# Patient Record
Sex: FEMALE | Race: WHITE | NOT HISPANIC OR LATINO
[De-identification: names, ages, dates, MRNs, and addresses within clinical notes are randomized per-mention and may not be internally consistent; named-entity substitution may affect disease eponyms.]

---

## 2018-10-31 PROBLEM — Z00.00 ENCOUNTER FOR PREVENTIVE HEALTH EXAMINATION: Status: ACTIVE | Noted: 2018-10-31

## 2018-11-05 ENCOUNTER — RECORD ABSTRACTING (OUTPATIENT)
Age: 83
End: 2018-11-05

## 2018-11-05 DIAGNOSIS — Z87.39 PERSONAL HISTORY OF OTHER DISEASES OF THE MUSCULOSKELETAL SYSTEM AND CONNECTIVE TISSUE: ICD-10-CM

## 2018-11-05 DIAGNOSIS — Z87.442 PERSONAL HISTORY OF URINARY CALCULI: ICD-10-CM

## 2018-11-05 DIAGNOSIS — Z78.9 OTHER SPECIFIED HEALTH STATUS: ICD-10-CM

## 2018-11-05 DIAGNOSIS — Z82.49 FAMILY HISTORY OF ISCHEMIC HEART DISEASE AND OTHER DISEASES OF THE CIRCULATORY SYSTEM: ICD-10-CM

## 2018-11-05 DIAGNOSIS — S06.6X9A TRAUMATIC SUBARACHNOID HEMORRHAGE WITH LOSS OF CONSCIOUSNESS OF UNSPECIFIED DURATION, INITIAL ENCOUNTER: ICD-10-CM

## 2018-11-05 DIAGNOSIS — Z95.0 PRESENCE OF CARDIAC PACEMAKER: ICD-10-CM

## 2018-11-05 DIAGNOSIS — Z82.3 FAMILY HISTORY OF STROKE: ICD-10-CM

## 2018-11-05 DIAGNOSIS — Z85.828 PERSONAL HISTORY OF OTHER MALIGNANT NEOPLASM OF SKIN: ICD-10-CM

## 2018-11-05 DIAGNOSIS — Z90.49 ACQUIRED ABSENCE OF OTHER SPECIFIED PARTS OF DIGESTIVE TRACT: ICD-10-CM

## 2018-12-04 ENCOUNTER — RECORD ABSTRACTING (OUTPATIENT)
Age: 83
End: 2018-12-04

## 2018-12-07 ENCOUNTER — NON-APPOINTMENT (OUTPATIENT)
Age: 83
End: 2018-12-07

## 2018-12-07 ENCOUNTER — APPOINTMENT (OUTPATIENT)
Dept: CARDIOLOGY | Facility: CLINIC | Age: 83
End: 2018-12-07
Payer: MEDICARE

## 2018-12-07 VITALS
HEART RATE: 61 BPM | SYSTOLIC BLOOD PRESSURE: 135 MMHG | HEIGHT: 61 IN | BODY MASS INDEX: 23.98 KG/M2 | WEIGHT: 127 LBS | DIASTOLIC BLOOD PRESSURE: 70 MMHG

## 2018-12-07 PROCEDURE — 99215 OFFICE O/P EST HI 40 MIN: CPT

## 2018-12-07 PROCEDURE — 93000 ELECTROCARDIOGRAM COMPLETE: CPT

## 2018-12-07 NOTE — HISTORY OF PRESENT ILLNESS
[FreeTextEntry1] : Since her last examination 6 months ago she reports no major events or hospitalizations. She is quite active as a caregiver and doing her usual activities. She performs baylee chi. There have been no acute symptoms of shortness of breath, chest discomfort, lightheadedness, palpitations. She does note some imbalance. No syncope. The imbalance has been a long-standing issue.

## 2018-12-07 NOTE — PHYSICAL EXAM
[General Appearance - Well Developed] : well developed [Normal Appearance] : normal appearance [Well Groomed] : well groomed [General Appearance - Well Nourished] : well nourished [No Deformities] : no deformities [General Appearance - In No Acute Distress] : no acute distress [Normal Conjunctiva] : the conjunctiva exhibited no abnormalities [Eyelids - No Xanthelasma] : the eyelids demonstrated no xanthelasmas [Normal Oral Mucosa] : normal oral mucosa [No Oral Pallor] : no oral pallor [No Oral Cyanosis] : no oral cyanosis [Normal Jugular Venous A Waves Present] : normal jugular venous A waves present [Normal Jugular Venous V Waves Present] : normal jugular venous V waves present [No Jugular Venous Rosales A Waves] : no jugular venous rosales A waves [Respiration, Rhythm And Depth] : normal respiratory rhythm and effort [Exaggerated Use Of Accessory Muscles For Inspiration] : no accessory muscle use [Auscultation Breath Sounds / Voice Sounds] : lungs were clear to auscultation bilaterally [Heart Sounds] : normal S1 and S2 [Abdomen Soft] : soft [Abdomen Tenderness] : non-tender [Abdomen Mass (___ Cm)] : no abdominal mass palpated [Abnormal Walk] : normal gait [Gait - Sufficient For Exercise Testing] : the gait was sufficient for exercise testing [Nail Clubbing] : no clubbing of the fingernails [Cyanosis, Localized] : no localized cyanosis [Petechial Hemorrhages (___cm)] : no petechial hemorrhages [Skin Color & Pigmentation] : normal skin color and pigmentation [] : no rash [No Venous Stasis] : no venous stasis [Skin Lesions] : no skin lesions [No Skin Ulcers] : no skin ulcer [No Xanthoma] : no  xanthoma was observed [Oriented To Time, Place, And Person] : oriented to person, place, and time [Affect] : the affect was normal [Mood] : the mood was normal [No Anxiety] : not feeling anxious [FreeTextEntry1] : 1/6 systolic murmur apex

## 2018-12-07 NOTE — REVIEW OF SYSTEMS
[see HPI] : see HPI [Negative] : Endocrine [Easy Bleeding] : no tendency for easy bleeding [Easy Bruising] : no tendency for easy bruising

## 2018-12-07 NOTE — REASON FOR VISIT
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

## 2018-12-13 ENCOUNTER — RESULT REVIEW (OUTPATIENT)
Age: 83
End: 2018-12-13

## 2018-12-14 ENCOUNTER — RESULT REVIEW (OUTPATIENT)
Age: 83
End: 2018-12-14

## 2019-02-05 ENCOUNTER — APPOINTMENT (OUTPATIENT)
Dept: GERIATRICS | Facility: CLINIC | Age: 84
End: 2019-02-05

## 2019-02-07 ENCOUNTER — APPOINTMENT (OUTPATIENT)
Dept: NEUROLOGY | Facility: CLINIC | Age: 84
End: 2019-02-07
Payer: MEDICARE

## 2019-02-07 VITALS
WEIGHT: 130 LBS | HEIGHT: 61 IN | HEART RATE: 59 BPM | BODY MASS INDEX: 24.55 KG/M2 | SYSTOLIC BLOOD PRESSURE: 151 MMHG | TEMPERATURE: 97.6 F | DIASTOLIC BLOOD PRESSURE: 62 MMHG

## 2019-02-07 PROCEDURE — 99204 OFFICE O/P NEW MOD 45 MIN: CPT

## 2019-02-08 NOTE — ASSESSMENT
[FreeTextEntry1] : Ms. Ford is a 89-year-old woman with a chronic gait abnormality which is multifactorial – age, mild visual and hearing impairment, chronic white matter microvascular ischemic changes, ?alcohol use.  \par She has had multiple sessions of physical therapy for gait and balance training. The last time was one year ago. I recommend she participate once again in this therapy.\par

## 2019-02-08 NOTE — HISTORY OF PRESENT ILLNESS
[FreeTextEntry1] : Ms. Ford is a 89-year-old woman with a long-standing history of a feeling of gait difficulty which she describes as an unsteadiness and imbalance, not feeling secure on her feet and having a strong fear of falling.  She has a history of one fall with traumatic subarachnoid hemorrhage.  She has had this feeling of imbalance for several years before this fall.  She denies any numbness. This feeling of gait imbalance has progressively worsened over the years. She has had multiple sessions of physical therapy for gait training and fall prevention.  She does not use a cane.  \par \par She cares for her  who is chronically ill. 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 \par \par She drinks about one drink of alcohol per day.

## 2019-02-08 NOTE — REVIEW OF SYSTEMS
[Tingling] : tingling [Dizziness] : dizziness [Negative] : Heme/Lymph [de-identified] : of balance

## 2019-02-08 NOTE — PHYSICAL EXAM
[FreeTextEntry1] : Physical examination \par General: No acute distress, Awake, Alert.   \par Fundus: disc margins sharp.   \par Neck: no Carotid bruit.   \par Cardiovascular: Normal rate, Regular rhythm, No murmur.  \par \par \par Mental status \par Awake, alert, and oriented to person, time and place, Normal attention span and concentration, Recent and remote memory intact, Language intact, Fund of knowledge intact.   \par \par Cranial Nerves \par II: VFF  \par III, IV, VI: PERRL, EOMI.   \par V: Facial sensation is normal B/L.   \par VII: Facial strength is normal B/L. \par VIII: Gross hearing is intact.   \par IX, X: Palate is midline and elevates symmetrically.   \par XI: Trapezius normal strength.   \par XII: Tongue midline without atrophy or fasciculations. \par \par Motor exam  \par Muscle tone - no evidence of rigidity or resistance in all 4 extremities.  \par No atrophy or fasciculations \par Muscle Strength: arms and legs, proximal and distal flexors and extensors are normal \par \par No UE drift.\par \par Reflexes \par All present, normal, and symmetrical.   \par \par Plantars right: downgoing\par Plantars left: downgoing\par  \par \par Coordination \par Finger to nose: Normal.  \par Heel to shin: Normal.   \par \par Sensory \par Intact sensation to vibration and cold.\par \par \par Gait \par Unable to perform heel, toe, and tandem gait.  Veering to either side almost continually. \par

## 2019-03-07 ENCOUNTER — APPOINTMENT (OUTPATIENT)
Dept: GERIATRICS | Facility: CLINIC | Age: 84
End: 2019-03-07
Payer: MEDICARE

## 2019-03-07 VITALS
TEMPERATURE: 98.1 F | WEIGHT: 129 LBS | HEART RATE: 67 BPM | SYSTOLIC BLOOD PRESSURE: 132 MMHG | BODY MASS INDEX: 24.37 KG/M2 | OXYGEN SATURATION: 97 % | DIASTOLIC BLOOD PRESSURE: 60 MMHG

## 2019-03-07 PROCEDURE — 99214 OFFICE O/P EST MOD 30 MIN: CPT

## 2019-03-07 RX ORDER — NAFTIFINE HYDROCHLORIDE 20 MG/G
2 CREAM TOPICAL
Qty: 45 | Refills: 0 | Status: DISCONTINUED | COMMUNITY
Start: 2018-10-01 | End: 2019-03-07

## 2019-03-07 RX ORDER — BETAMETHASONE DIPROPIONATE 0.5 MG/ML
0.05 LOTION, AUGMENTED TOPICAL
Qty: 60 | Refills: 0 | Status: DISCONTINUED | COMMUNITY
Start: 2018-11-19 | End: 2019-03-07

## 2019-03-07 NOTE — ASSESSMENT
[FreeTextEntry1] : next visit annual physical\par coping with husbands dementia with one friend and especially with care giver support group\par sleeping well\par not wanting to be on urinary incontinence medication\par not wanting to increase antidepressant feels symptoms are controlled\par walking dog daily at least 3x a day and helping her cope as well\par feels memory is changing and she is still watching and playing jeopardy

## 2019-03-07 NOTE — SOCIAL HISTORY
[No falls in past year] : Patient reported no falls in the past year [Fully functional (bathing, dressing, toileting, transferring, walking, feeding)] : Fully functional (bathing, dressing, toileting, transferring, walking, feeding) [Fully functional (using the telephone, shopping, preparing meals, housekeeping, doing laundry, using transportation,] : Fully functional and needs no help or supervision to perform IADLs (using the telephone, shopping, preparing meals, housekeeping, doing laundry, using transportation, managing medications and managing finances) [Canes] : brigida [Smoke Detector] : smoke detector [Carbon Monoxide Detector] : carbon monoxide detector [Grab Bars] : grab bars [Shower Chair] : shower chair [Seat Belt] :  uses seat belt [Driving] : driving

## 2019-03-07 NOTE — HISTORY OF PRESENT ILLNESS
[0] : 2) Feeling down, depressed, or hopeless: Not at all [PHQ-2 Score ___] : PHQ-2 Score [unfilled] [FreeTextEntry1] :  with advancing dementia\par she benefits from [place] care givers meetings\par seeing dermatology for flaky scalp\par bladder incontinence - frequency and accidents both increasing\par no burning on urination

## 2019-03-07 NOTE — PHYSICAL EXAM
[General Appearance - Alert] : alert [General Appearance - In No Acute Distress] : in no acute distress [General Appearance - Well Nourished] : well nourished [General Appearance - Well Developed] : well developed [Sclera] : the sclera and conjunctiva were normal [PERRL With Normal Accommodation] : pupils were equal in size, round, and reactive to light [Extraocular Movements] : extraocular movements were intact [Normal Oral Mucosa] : normal oral mucosa [No Oral Pallor] : no oral pallor [Neck Appearance] : the appearance of the neck was normal [Respiration, Rhythm And Depth] : normal respiratory rhythm and effort [Exaggerated Use Of Accessory Muscles For Inspiration] : no accessory muscle use [Auscultation Breath Sounds / Voice Sounds] : lungs were clear to auscultation bilaterally [Heart Rate And Rhythm] : heart rate was normal and rhythm regular [Heart Sounds] : normal S1 and S2 [Heart Sounds Gallop] : no gallops [Bowel Sounds] : normal bowel sounds [Abdomen Soft] : soft [Abdomen Tenderness] : non-tender [No CVA Tenderness] : no ~M costovertebral angle tenderness [No Spinal Tenderness] : no spinal tenderness [Abnormal Walk] : normal gait [Skin Color & Pigmentation] : normal skin color and pigmentation [] : no rash [Affect] : the affect was normal [Mood] : the mood was normal [FreeTextEntry1] : uses cane

## 2019-03-07 NOTE — REVIEW OF SYSTEMS
[Incontinence] : incontinence [Fever] : no fever [Chills] : no chills [Eye Pain] : no eye pain [Red Eyes] : eyes not red [Sore Throat] : no sore throat [Hoarseness] : no hoarseness [Chest Pain] : no chest pain [Palpitations] : no palpitations [Shortness Of Breath] : no shortness of breath [Wheezing] : no wheezing [Abdominal Pain] : no abdominal pain [Vomiting] : no vomiting [Dysuria] : no dysuria [Arthralgias] : no arthralgias [Joint Pain] : no joint pain [Skin Lesions] : no skin lesions [Skin Wound] : no skin wound [Dizziness] : no dizziness [Fainting] : no fainting [Suicidal] : not suicidal [Sleep Disturbances] : no sleep disturbances

## 2019-04-25 ENCOUNTER — RESULT REVIEW (OUTPATIENT)
Age: 84
End: 2019-04-25

## 2019-04-25 ENCOUNTER — APPOINTMENT (OUTPATIENT)
Dept: GERIATRICS | Facility: CLINIC | Age: 84
End: 2019-04-25
Payer: MEDICARE

## 2019-04-25 VITALS
WEIGHT: 130 LBS | TEMPERATURE: 98.7 F | SYSTOLIC BLOOD PRESSURE: 128 MMHG | OXYGEN SATURATION: 96 % | HEIGHT: 61 IN | BODY MASS INDEX: 24.55 KG/M2 | HEART RATE: 62 BPM | DIASTOLIC BLOOD PRESSURE: 76 MMHG

## 2019-04-25 PROCEDURE — 99213 OFFICE O/P EST LOW 20 MIN: CPT

## 2019-04-25 NOTE — PHYSICAL EXAM
[Well-Appearing] : well-appearing [No Acute Distress] : no acute distress [Normal Sclera/Conjunctiva] : normal sclera/conjunctiva [PERRL] : pupils equal round and reactive to light [EOMI] : extraocular movements intact [Normal Oropharynx] : the oropharynx was normal [Normal Outer Ear/Nose] : the outer ears and nose were normal in appearance [Supple] : supple [Normal TMs] : both tympanic membranes were normal [No Lymphadenopathy] : no lymphadenopathy [No Respiratory Distress] : no respiratory distress  [No Accessory Muscle Use] : no accessory muscle use [Clear to Auscultation] : lungs were clear to auscultation bilaterally [Normal Rate] : normal rate  [Normal S1, S2] : normal S1 and S2 [Regular Rhythm] : with a regular rhythm [Non Tender] : non-tender [Soft] : abdomen soft [Non-distended] : non-distended [Normal Supraclavicular Nodes] : no supraclavicular lymphadenopathy [Normal Bowel Sounds] : normal bowel sounds [Normal Posterior Cervical Nodes] : no posterior cervical lymphadenopathy [Normal Anterior Cervical Nodes] : no anterior cervical lymphadenopathy [No Spinal Tenderness] : no spinal tenderness [No Focal Deficits] : no focal deficits [Grossly Normal Strength/Tone] : grossly normal strength/tone [Alert and Oriented x3] : oriented to person, place, and time [Normal Affect] : the affect was normal [de-identified] : 1/6 murmur [de-identified] : Tenderness to left ischium

## 2019-04-25 NOTE — REVIEW OF SYSTEMS
[Fever] : no fever [Chills] : no chills [Fatigue] : fatigue [Night Sweats] : no night sweats [Recent Change In Weight] : ~T no recent weight change [Chest Pain] : no chest pain [Palpitations] : no palpitations [Shortness Of Breath] : no shortness of breath [Dyspnea on Exertion] : no dyspnea on exertion [Dysuria] : no dysuria [Incontinence] : incontinence [Frequency] : no frequency [Back Pain] : back pain [de-identified] : Unsteady gait/swaying [Negative] : Neurological

## 2019-04-25 NOTE — HISTORY OF PRESENT ILLNESS
[FreeTextEntry8] : 89 year old female with a history of T2DM, HLD, HTN, PVD, valvular heart disease, SAH, depression, memory loss, vitamin D deficiency presents today for an acute visit.  \par \par Patient states the last 1-2 days she has been experiencing extreme fatigue.  Had gone to chair yoga 2 days ago.  Yesterday had left lower back / hip pain.  Today pain has improved but still present when sitting, took Aleve, which helped.  Fatigue has slightly improved.  She sleeps well, about 8 hours, uninterrupted. Denies fever, chills, melena, hematuria, BRBPR, hypothyroid symptoms, CP, palpitations, dizziness or syncope.  Patient did host [employer] for her family this weekend which was a lot for her.  [Family Member] : family member

## 2019-04-25 NOTE — HEALTH RISK ASSESSMENT
[No falls in past year] : Patient reported no falls in the past year [] : No [0] : 2) Feeling down, depressed, or hopeless: Not at all (0) [[id number]] : 0

## 2019-04-25 NOTE — ASSESSMENT
[FreeTextEntry1] : Fatigue: Will check CBC, CMP, A1c, TSH, UA, vit B12.  Has follow up with cardiology on 6/12/2019. \par \par LBP: Advised conservative treatment with OTC NSAIDS or Tylenol as needed, can also use topical heat.  Discussed proper body mechanics. \par \par Follow up with PCP as scheduled.

## 2019-05-21 ENCOUNTER — RX RENEWAL (OUTPATIENT)
Age: 84
End: 2019-05-21

## 2019-06-11 ENCOUNTER — RECORD ABSTRACTING (OUTPATIENT)
Age: 84
End: 2019-06-11

## 2019-06-11 DIAGNOSIS — Z82.49 FAMILY HISTORY OF ISCHEMIC HEART DISEASE AND OTHER DISEASES OF THE CIRCULATORY SYSTEM: ICD-10-CM

## 2019-06-12 ENCOUNTER — APPOINTMENT (OUTPATIENT)
Dept: CARDIOLOGY | Facility: CLINIC | Age: 84
End: 2019-06-12
Payer: MEDICARE

## 2019-06-12 ENCOUNTER — NON-APPOINTMENT (OUTPATIENT)
Age: 84
End: 2019-06-12

## 2019-06-12 VITALS
WEIGHT: 126 LBS | SYSTOLIC BLOOD PRESSURE: 134 MMHG | HEIGHT: 60 IN | DIASTOLIC BLOOD PRESSURE: 62 MMHG | BODY MASS INDEX: 24.74 KG/M2

## 2019-06-12 PROCEDURE — 99215 OFFICE O/P EST HI 40 MIN: CPT

## 2019-06-12 PROCEDURE — 93000 ELECTROCARDIOGRAM COMPLETE: CPT

## 2019-06-12 RX ORDER — ESOMEPRAZOLE MAGNESIUM 40 MG/1
40 CAPSULE, DELAYED RELEASE ORAL DAILY
Refills: 0 | Status: DISCONTINUED | COMMUNITY
End: 2019-06-12

## 2019-06-12 RX ORDER — ESCITALOPRAM OXALATE 10 MG/1
10 TABLET, FILM COATED ORAL
Refills: 0 | Status: DISCONTINUED | COMMUNITY
End: 2019-06-12

## 2019-06-12 NOTE — PHYSICAL EXAM
[General Appearance - Well Developed] : well developed [Normal Appearance] : normal appearance [General Appearance - Well Nourished] : well nourished [Well Groomed] : well groomed [General Appearance - In No Acute Distress] : no acute distress [No Deformities] : no deformities [Normal Conjunctiva] : the conjunctiva exhibited no abnormalities [Normal Oral Mucosa] : normal oral mucosa [Eyelids - No Xanthelasma] : the eyelids demonstrated no xanthelasmas [No Oral Cyanosis] : no oral cyanosis [Normal Jugular Venous A Waves Present] : normal jugular venous A waves present [No Oral Pallor] : no oral pallor [No Jugular Venous Rosales A Waves] : no jugular venous rosales A waves [Normal Jugular Venous V Waves Present] : normal jugular venous V waves present [Respiration, Rhythm And Depth] : normal respiratory rhythm and effort [Exaggerated Use Of Accessory Muscles For Inspiration] : no accessory muscle use [Auscultation Breath Sounds / Voice Sounds] : lungs were clear to auscultation bilaterally [Abdomen Soft] : soft [Heart Sounds] : normal S1 and S2 [Abdomen Mass (___ Cm)] : no abdominal mass palpated [Abdomen Tenderness] : non-tender [Abnormal Walk] : normal gait [Gait - Sufficient For Exercise Testing] : the gait was sufficient for exercise testing [Cyanosis, Localized] : no localized cyanosis [Petechial Hemorrhages (___cm)] : no petechial hemorrhages [Nail Clubbing] : no clubbing of the fingernails [] : no ischemic changes [Skin Color & Pigmentation] : normal skin color and pigmentation [No Skin Ulcers] : no skin ulcer [No Venous Stasis] : no venous stasis [Skin Lesions] : no skin lesions [No Xanthoma] : no  xanthoma was observed [Oriented To Time, Place, And Person] : oriented to person, place, and time [Affect] : the affect was normal [No Anxiety] : not feeling anxious [Mood] : the mood was normal [FreeTextEntry1] : 1/6 systolic murmur apex

## 2019-06-12 NOTE — REASON FOR VISIT
[FreeTextEntry1] : Ms. BILLY AREVALO presents for cardiovascular evaluation.\par \par Her problem list includes:\par Coronary artery disease status post bypass surgery  with previous stent.\par History of complete heart block treated with pacemaker therapy. 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 Pandey.\par \par

## 2019-06-12 NOTE — ADDENDUM
[FreeTextEntry1] : Instructions to staff:\par \par Send a copy of this report to the following provider(s):\par As above\par \par Schedule followup:\par 6 month office visit\par 3 months [facility] clinic\par \par Schedule testing:\par None required at present\par \par \par This report was generated using Dragon Dictation. Please excuse obvious typographical errors and contact this office for any questions. Any preliminary copy of this note given to the patient at the time of this visit has not been proofread or edited. This note is a part of a shared electronic record used by our physicians and may contain information generated by other physicians in this practice in addition to your visit with this office.

## 2019-06-12 NOTE — HISTORY OF PRESENT ILLNESS
[FreeTextEntry1] : This 89-year-old female patient presents for evaluation of complex cardiovascular and medical issues.\par \par Her problems is as noted above.\par Since her last examination 6 months ago she reports no major events or hospitalizations. She tries to stay active. She walks the dog regularly. No acute symptoms of shortness of breath, chest discomfort, palpitation, lightheadedness. She is unsteady and has seen her neurologist. She is also general medical followup and have laboratories as noted below.\par \par She is under considerable caregiver stress. Her  has a dementia.

## 2019-06-12 NOTE — DISCUSSION/SUMMARY
[FreeTextEntry1] : Problem list/impressions/recommendations.\par \par Coronary artery disease.\par Status post CABG 2015. LIMA to LAD, SVG to RCA, SVG to OM1. Previous stent.\par Clinically stable.\par \par Complete heart block/pacemaker therapy\par Medtronic dual-chamber MRI compatible.\par She has a transtelephonic system.\par I will ask her to return for a complete analysis.\par \par Valvular heart disease.\par Aortic stenosis, mitral insufficiency.\par Overall mild. Satisfactory echo findings 12/2018\par I have advised the patient regarding appropriate precautions.\par \par Hypertension\par Good control.\par \par Dyslipidemia\par Good LDL control.\par \par Peripheral vascular disease\par Status post right lower extremity stent or iliac artery dissection associated with inter-aortic balloon pump during angiography. Doctor Beau.\par Clinically stable.\par \par Recommendation\par Continue current routine\par For her convenience, I taken the liberty of changing her metoprolol tartrate to 25 mg twice a day instead of half of a 50 mg tablet twice a day.\par Followup 6 months to me\par [facility] clinic followup in 3 months\par \par

## 2019-08-14 ENCOUNTER — RESULT REVIEW (OUTPATIENT)
Age: 84
End: 2019-08-14

## 2019-08-14 ENCOUNTER — APPOINTMENT (OUTPATIENT)
Dept: GERIATRICS | Facility: CLINIC | Age: 84
End: 2019-08-14
Payer: MEDICARE

## 2019-08-14 VITALS
SYSTOLIC BLOOD PRESSURE: 150 MMHG | WEIGHT: 131 LBS | HEART RATE: 75 BPM | BODY MASS INDEX: 25.58 KG/M2 | DIASTOLIC BLOOD PRESSURE: 65 MMHG | TEMPERATURE: 98 F | OXYGEN SATURATION: 96 %

## 2019-08-14 PROCEDURE — 99215 OFFICE O/P EST HI 40 MIN: CPT | Mod: 25

## 2019-08-14 PROCEDURE — G0439: CPT

## 2019-08-16 NOTE — PHYSICAL EXAM
[General Appearance - Alert] : alert [General Appearance - In No Acute Distress] : in no acute distress [General Appearance - Well Nourished] : well nourished [General Appearance - Well Developed] : well developed [Sclera] : the sclera and conjunctiva were normal [PERRL With Normal Accommodation] : pupils were equal in size, round, and reactive to light [Extraocular Movements] : extraocular movements were intact [Normal Oral Mucosa] : normal oral mucosa [No Oral Pallor] : no oral pallor [Neck Appearance] : the appearance of the neck was normal [Respiration, Rhythm And Depth] : normal respiratory rhythm and effort [Exaggerated Use Of Accessory Muscles For Inspiration] : no accessory muscle use [Auscultation Breath Sounds / Voice Sounds] : lungs were clear to auscultation bilaterally [Heart Rate And Rhythm] : heart rate was normal and rhythm regular [Heart Sounds] : normal S1 and S2 [Heart Sounds Gallop] : no gallops [Breast Appearance] : normal in appearance [Breast Palpation Mass] : no palpable masses [Breast Abnormal Lactation (Galactorrhea)] : no nipple discharge [Bowel Sounds] : normal bowel sounds [Abdomen Soft] : soft [Abdomen Tenderness] : non-tender [Cervical Lymph Nodes Enlarged Posterior Bilaterally] : posterior cervical [Cervical Lymph Nodes Enlarged Anterior Bilaterally] : anterior cervical [Supraclavicular Lymph Nodes Enlarged Bilaterally] : supraclavicular [Axillary Lymph Nodes Enlarged Bilaterally] : axillary [No CVA Tenderness] : no ~M costovertebral angle tenderness [No Spinal Tenderness] : no spinal tenderness [Abnormal Walk] : normal gait [FreeTextEntry1] : uses cane [Skin Color & Pigmentation] : normal skin color and pigmentation [] : no rash [Affect] : the affect was normal [Mood] : the mood was normal

## 2019-08-16 NOTE — ASSESSMENT
[FreeTextEntry1] : next visit memory testing  - MOCA to evaluate\par based on results will discuss her driving and the need to continue at all\par frequency - rule out UTI refer to urogynecoloy Dr. Rogel\par discussed pseduodemetnia could memory deficits be worsened due to uncontrolled depression\par over husbands illness.  \par Prevnar given today

## 2019-08-16 NOTE — REVIEW OF SYSTEMS
[Fever] : no fever [Chills] : no chills [Eye Pain] : no eye pain [Red Eyes] : eyes not red [Sore Throat] : no sore throat [Hoarseness] : no hoarseness [Chest Pain] : no chest pain [Palpitations] : no palpitations [Shortness Of Breath] : no shortness of breath [Wheezing] : no wheezing [Abdominal Pain] : no abdominal pain [Vomiting] : no vomiting [Dysuria] : no dysuria [Incontinence] : incontinence [Arthralgias] : no arthralgias [Joint Pain] : no joint pain [Skin Lesions] : no skin lesions [Skin Wound] : no skin wound [Dizziness] : no dizziness [Fainting] : no fainting [Suicidal] : not suicidal [Sleep Disturbances] : no sleep disturbances

## 2019-08-16 NOTE — HISTORY OF PRESENT ILLNESS
[0] : 0 [Spouse] : spouse [None] : The patient has no concerns about alcohol abuse [Never] : has never used illicit drugs [Compliant with medications] : compliant with medications [Fully Independent] : fully independent [Drives without concerns] : drives without concerns [No history of falls] : no history of falls [Safe Driving Habits] : safe driving habits [Seatbelts] : seatbelts [Smoke Detectors] : smoke detectors [Carbon Monoxide Detector] : carbon monoxide detector [Bathroom Grab Bars] : bathroom grab bars [PMH Reviewed and Updated] : past medical history reviewed and updated [PSH Reviewed and Updated] : past surgical history reviewed and updated [Family History Reviewed and Updated] : family history reviewed and updated [Medication and Allergies Reconciled] : medication and allergies reconciled [Over the Past 2 Weeks, Have You Felt Down, Depressed, or Hopeless?] : 1.) Over the past 2 weeks, have you felt down, depressed, or hopeless? No [Over the Past 2 Weeks, Have You Felt Little Interest or Pleasure Doing Things?] : 2.) Over the past 2 weeks, have you felt little interest or pleasure doing things? No [Retired] : retired from work [Unable To Manage Meds] : ability to manage ~his/her~ medications [Adequate] : adequate [Children] : children [Extended Family] : extended family [FreeTextEntry1] : now on clobetasol topical for cradle cap\par complaining of urinary frequency no dysuria urinates at least 4-5x per day\par had not wanted to be on medications in past due to fear of constipation and dry mouth\par fatigue persists\par shoulder pain voltaran working with exercises Right shoulder\par anxiety and stress\par

## 2019-08-16 NOTE — REASON FOR VISIT
[Subsequent Annual Medicare Wellness Visit] : a subsequent annual Medicare wellness visit [Family Member] : family member

## 2019-09-12 ENCOUNTER — APPOINTMENT (OUTPATIENT)
Dept: CARDIOLOGY | Facility: CLINIC | Age: 84
End: 2019-09-12

## 2019-09-17 ENCOUNTER — RX RENEWAL (OUTPATIENT)
Age: 84
End: 2019-09-17

## 2019-10-10 ENCOUNTER — APPOINTMENT (OUTPATIENT)
Dept: GERIATRICS | Facility: CLINIC | Age: 84
End: 2019-10-10
Payer: MEDICARE

## 2019-10-10 PROCEDURE — 99213 OFFICE O/P EST LOW 20 MIN: CPT

## 2019-10-14 VITALS — RESPIRATION RATE: 16 BRPM | DIASTOLIC BLOOD PRESSURE: 70 MMHG | SYSTOLIC BLOOD PRESSURE: 140 MMHG | HEART RATE: 70 BPM

## 2019-10-14 NOTE — HISTORY OF PRESENT ILLNESS
[FreeTextEntry1] : presenting with daughter and \par has seen Dr. Rogel\par now on estrogen cream to help prevent UTI and help with urinary incontinence\par here also for flu shot

## 2019-10-14 NOTE — REVIEW OF SYSTEMS
[Eye Pain] : no eye pain [Chills] : no chills [Fever] : no fever [Hoarseness] : no hoarseness [Red Eyes] : eyes not red [Sore Throat] : no sore throat [Palpitations] : no palpitations [Shortness Of Breath] : no shortness of breath [Chest Pain] : no chest pain [Abdominal Pain] : no abdominal pain [Vomiting] : no vomiting [Wheezing] : no wheezing [Incontinence] : incontinence [Dysuria] : no dysuria [Skin Lesions] : no skin lesions [Arthralgias] : no arthralgias [Joint Pain] : no joint pain [Skin Wound] : no skin wound [Dizziness] : no dizziness [Fainting] : no fainting [Suicidal] : not suicidal [Sleep Disturbances] : no sleep disturbances

## 2019-10-14 NOTE — PHYSICAL EXAM
[General Appearance - In No Acute Distress] : in no acute distress [General Appearance - Alert] : alert [General Appearance - Well Developed] : well developed [General Appearance - Well Nourished] : well nourished [Sclera] : the sclera and conjunctiva were normal [PERRL With Normal Accommodation] : pupils were equal in size, round, and reactive to light [Extraocular Movements] : extraocular movements were intact [Normal Oral Mucosa] : normal oral mucosa [No Oral Pallor] : no oral pallor [Respiration, Rhythm And Depth] : normal respiratory rhythm and effort [Neck Appearance] : the appearance of the neck was normal [Exaggerated Use Of Accessory Muscles For Inspiration] : no accessory muscle use [Heart Rate And Rhythm] : heart rate was normal and rhythm regular [Auscultation Breath Sounds / Voice Sounds] : lungs were clear to auscultation bilaterally [Breast Appearance] : normal in appearance [Heart Sounds] : normal S1 and S2 [Heart Sounds Gallop] : no gallops [Breast Abnormal Lactation (Galactorrhea)] : no nipple discharge [Breast Palpation Mass] : no palpable masses [Bowel Sounds] : normal bowel sounds [Abdomen Tenderness] : non-tender [Abdomen Soft] : soft [Cervical Lymph Nodes Enlarged Posterior Bilaterally] : posterior cervical [Supraclavicular Lymph Nodes Enlarged Bilaterally] : supraclavicular [Cervical Lymph Nodes Enlarged Anterior Bilaterally] : anterior cervical [Axillary Lymph Nodes Enlarged Bilaterally] : axillary [No Spinal Tenderness] : no spinal tenderness [No CVA Tenderness] : no ~M costovertebral angle tenderness [] : no rash [Skin Color & Pigmentation] : normal skin color and pigmentation [FreeTextEntry1] : uses cane [Abnormal Walk] : normal gait [Affect] : the affect was normal [Mood] : the mood was normal

## 2019-10-23 ENCOUNTER — APPOINTMENT (OUTPATIENT)
Dept: GERIATRICS | Facility: CLINIC | Age: 84
End: 2019-10-23

## 2019-11-07 ENCOUNTER — APPOINTMENT (OUTPATIENT)
Dept: GERIATRICS | Facility: CLINIC | Age: 84
End: 2019-11-07
Payer: MEDICARE

## 2019-11-07 VITALS
DIASTOLIC BLOOD PRESSURE: 70 MMHG | BODY MASS INDEX: 25.19 KG/M2 | HEART RATE: 70 BPM | SYSTOLIC BLOOD PRESSURE: 150 MMHG | OXYGEN SATURATION: 96 % | TEMPERATURE: 98.4 F | WEIGHT: 129 LBS

## 2019-11-07 DIAGNOSIS — E55.9 VITAMIN D DEFICIENCY, UNSPECIFIED: ICD-10-CM

## 2019-11-07 DIAGNOSIS — E53.8 DEFICIENCY OF OTHER SPECIFIED B GROUP VITAMINS: ICD-10-CM

## 2019-11-07 DIAGNOSIS — S06.6X0D TRAUMATIC SUBARACHNOID HEMORRHAGE W/OUT LOSS OF CONSCIOUSNESS, SUBSEQUENT ENCOUNTER: ICD-10-CM

## 2019-11-07 PROCEDURE — 99215 OFFICE O/P EST HI 40 MIN: CPT

## 2019-11-07 NOTE — REVIEW OF SYSTEMS
[Incontinence] : incontinence [Fever] : no fever [Chills] : no chills [Eye Pain] : no eye pain [Red Eyes] : eyes not red [Sore Throat] : no sore throat [Hoarseness] : no hoarseness [Chest Pain] : no chest pain [Palpitations] : no palpitations [Shortness Of Breath] : no shortness of breath [Wheezing] : no wheezing [Abdominal Pain] : no abdominal pain [Vomiting] : no vomiting [Dysuria] : no dysuria [Arthralgias] : no arthralgias [Joint Pain] : no joint pain [Skin Lesions] : no skin lesions [Dizziness] : no dizziness [Skin Wound] : no skin wound [Suicidal] : not suicidal [Fainting] : no fainting [Sleep Disturbances] : no sleep disturbances

## 2019-11-07 NOTE — PHYSICAL EXAM
[General Appearance - Alert] : alert [General Appearance - In No Acute Distress] : in no acute distress [General Appearance - Well Developed] : well developed [General Appearance - Well Nourished] : well nourished [PERRL With Normal Accommodation] : pupils were equal in size, round, and reactive to light [Sclera] : the sclera and conjunctiva were normal [Normal Oral Mucosa] : normal oral mucosa [Extraocular Movements] : extraocular movements were intact [No Oral Pallor] : no oral pallor [Neck Appearance] : the appearance of the neck was normal [Exaggerated Use Of Accessory Muscles For Inspiration] : no accessory muscle use [Respiration, Rhythm And Depth] : normal respiratory rhythm and effort [Auscultation Breath Sounds / Voice Sounds] : lungs were clear to auscultation bilaterally [Heart Rate And Rhythm] : heart rate was normal and rhythm regular [Heart Sounds] : normal S1 and S2 [Heart Sounds Gallop] : no gallops [Breast Appearance] : normal in appearance [Breast Palpation Mass] : no palpable masses [Abdomen Soft] : soft [Bowel Sounds] : normal bowel sounds [Breast Abnormal Lactation (Galactorrhea)] : no nipple discharge [Abdomen Tenderness] : non-tender [Cervical Lymph Nodes Enlarged Anterior Bilaterally] : anterior cervical [Supraclavicular Lymph Nodes Enlarged Bilaterally] : supraclavicular [Cervical Lymph Nodes Enlarged Posterior Bilaterally] : posterior cervical [Axillary Lymph Nodes Enlarged Bilaterally] : axillary [No CVA Tenderness] : no ~M costovertebral angle tenderness [Abnormal Walk] : normal gait [No Spinal Tenderness] : no spinal tenderness [Skin Color & Pigmentation] : normal skin color and pigmentation [] : no rash [Affect] : the affect was normal [Mood] : the mood was normal [FreeTextEntry1] : uses cane

## 2019-11-07 NOTE — ASSESSMENT
[FreeTextEntry1] : MOCA done 28/30\par normal results\par memory loss could be age related (high educational status) \par continue estrogen cream\par discussed pseduodemetnia could memory deficits be worsened due to uncontrolled depression\par over husbands illness.  \par

## 2019-11-07 NOTE — SOCIAL HISTORY
[One fall no injury in past year] : Patient reported one fall in the past year without injury [Fully functional (bathing, dressing, toileting, transferring, walking, feeding)] : Fully functional (bathing, dressing, toileting, transferring, walking, feeding) [Fully functional (using the telephone, shopping, preparing meals, housekeeping, doing laundry, using transportation,] : Fully functional and needs no help or supervision to perform IADLs (using the telephone, shopping, preparing meals, housekeeping, doing laundry, using transportation, managing medications and managing finances) [Canes] : brigida [Grab Bars] : grab bars [Carbon Monoxide Detector] : carbon monoxide detector [Smoke Detector] : smoke detector [Driving] : driving [Shower Chair] : shower chair [Seat Belt] :  uses seat belt

## 2019-11-07 NOTE — HISTORY OF PRESENT ILLNESS
[PHQ-2 Score ___] : PHQ-2 Score [unfilled] [1] : 2) Feeling down, depressed, or hopeless for several days [FreeTextEntry1] : presenting with daughter Beth\par has seen Dr. Rogel\par now on estrogen cream to help prevent UTI and help with urinary incontinence\par here for memory testing\par

## 2019-12-06 ENCOUNTER — MEDICATION RENEWAL (OUTPATIENT)
Age: 84
End: 2019-12-06

## 2020-02-14 ENCOUNTER — RX RENEWAL (OUTPATIENT)
Age: 85
End: 2020-02-14

## 2020-02-25 ENCOUNTER — RESULT REVIEW (OUTPATIENT)
Age: 85
End: 2020-02-25

## 2020-02-25 ENCOUNTER — APPOINTMENT (OUTPATIENT)
Dept: CARDIOLOGY | Facility: CLINIC | Age: 85
End: 2020-02-25
Payer: MEDICARE

## 2020-02-25 VITALS
SYSTOLIC BLOOD PRESSURE: 144 MMHG | DIASTOLIC BLOOD PRESSURE: 70 MMHG | BODY MASS INDEX: 24.54 KG/M2 | OXYGEN SATURATION: 97 % | HEIGHT: 60 IN | HEART RATE: 68 BPM | WEIGHT: 125 LBS

## 2020-02-25 PROCEDURE — 99213 OFFICE O/P EST LOW 20 MIN: CPT

## 2020-02-25 RX ORDER — NITROGLYCERIN 0.4 MG/1
0.4 TABLET SUBLINGUAL
Refills: 0 | Status: COMPLETED | COMMUNITY
End: 2020-02-25

## 2020-02-27 NOTE — REVIEW OF SYSTEMS
[Dizziness] : dizziness [Under Stress] : under stress [Fever] : no fever [Headache] : no headache [Chills] : no chills [Shortness Of Breath] : no shortness of breath [Dyspnea on exertion] : not dyspnea during exertion [Chest Pain] : no chest pain [Palpitations] : no palpitations [Cough] : no cough [Easy Bruising] : no tendency for easy bruising [Confusion] : no confusion was observed

## 2020-02-27 NOTE — PHYSICAL EXAM
[General Appearance - Well Developed] : well developed [Normal Appearance] : normal appearance [Well Groomed] : well groomed [General Appearance - Well Nourished] : well nourished [Heart Rate And Rhythm] : heart rate and rhythm were normal [General Appearance - In No Acute Distress] : no acute distress [Systolic grade ___/6] : A grade [unfilled]/6 systolic murmur was heard. [Abdomen Soft] : soft [Cyanosis, Localized] : no localized cyanosis [] : no rash [Oriented To Time, Place, And Person] : oriented to person, place, and time [Impaired Insight] : insight and judgment were intact [Affect] : the affect was normal [FreeTextEntry1] : uses cane

## 2020-02-27 NOTE — HISTORY OF PRESENT ILLNESS
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

## 2020-05-06 ENCOUNTER — APPOINTMENT (OUTPATIENT)
Dept: GERIATRICS | Facility: CLINIC | Age: 85
End: 2020-05-06
Payer: MEDICARE

## 2020-05-06 ENCOUNTER — APPOINTMENT (OUTPATIENT)
Dept: GERIATRICS | Facility: CLINIC | Age: 85
End: 2020-05-06

## 2020-05-06 PROCEDURE — 99215 OFFICE O/P EST HI 40 MIN: CPT | Mod: 95

## 2020-05-11 NOTE — HISTORY OF PRESENT ILLNESS
[Stable] : Status: Stable [Moderate] : Stage: Moderate [Memory Lapses Or Loss] : stable memory impairment [1] : 1) Little interest or pleasure doing things for several days [0] : 2) Feeling down, depressed, or hopeless: Not at all [PHQ-2 Score ___] : PHQ-2 Score [unfilled] [FreeTextEntry1] : presenting with daughter [place]\par several concerns:\par imbalance/dizziness - daughters have seen her favor one side, veer to one side - but not consistently\par incontinence - has seen Dr. Rogel but hesitant to start medications due to side effects\par Does feel fatigue\par Needs her toenails cut - needs help due to balance issues\par Has dry skin\par Feels memory is "fine" but daughters disagree and want her to stop driving\par in pandemic clearly she has not been able to drive\par

## 2020-05-11 NOTE — REVIEW OF SYSTEMS
[Incontinence] : incontinence [Fever] : no fever [Chills] : no chills [Eye Pain] : no eye pain [Red Eyes] : eyes not red [Sore Throat] : no sore throat [Hoarseness] : no hoarseness [Chest Pain] : no chest pain [Palpitations] : no palpitations [Shortness Of Breath] : no shortness of breath [Abdominal Pain] : no abdominal pain [Wheezing] : no wheezing [Vomiting] : no vomiting [Dysuria] : no dysuria [Arthralgias] : no arthralgias [Joint Pain] : no joint pain [Skin Wound] : no skin wound [Skin Lesions] : no skin lesions [Dizziness] : no dizziness [Fainting] : no fainting [Suicidal] : not suicidal [Sleep Disturbances] : no sleep disturbances

## 2020-05-11 NOTE — ASSESSMENT
[FreeTextEntry1] : last MOCA done 28/30\par memory loss could be age related (high educational status) \par normal results\par Now given instability and worsening memory will consider MRI Brain and repeat MOCA on next visit\par also to consider OT driving eval for her safety\par refer to podiatry\par discussed pseudodementia could memory deficits be worsened due to uncontrolled depression\par over husbands illness.  \par also to check A1C given last level elevated, now brook skin, dizziness\par she will cut back on sweets\par labs when pandemic restircitons ease

## 2020-05-11 NOTE — PHYSICAL EXAM
[General Appearance - Alert] : alert [General Appearance - In No Acute Distress] : in no acute distress [Sclera] : the sclera and conjunctiva were normal [Neck Appearance] : the appearance of the neck was normal [Exaggerated Use Of Accessory Muscles For Inspiration] : no accessory muscle use [Skin Color & Pigmentation] : normal skin color and pigmentation [Affect] : the affect was normal [] : no rash [Mood] : the mood was normal [General Appearance - Well-Appearing] : healthy appearing [Strabismus] : no strabismus was seen [Outer Ear] : the ears and nose were normal in appearance [No Oral Cyanosis] : no oral cyanosis [Jugular Venous Distention Increased] : there was no jugular-venous distention [FreeTextEntry1] : forgetful, relies on daughter

## 2020-05-11 NOTE — REASON FOR VISIT
[Home] : at home, [unfilled] , at the time of the visit. [Medical Office: ([facility])___] : at the medical office located in  [Self] : self [Patient] : the patient [Pre-Visit Preparation] : pre-visit preparation was done [Acute] : an acute visit [Family Member] : family member

## 2020-06-02 RX ORDER — CYANOCOBALAMIN (VITAMIN B-12) 2500 MCG
2500 TABLET, SUBLINGUAL SUBLINGUAL DAILY
Qty: 90 | Refills: 3 | Status: DISCONTINUED | COMMUNITY
Start: 2020-03-05 | End: 2020-06-02

## 2020-06-02 RX ORDER — PNV NO.95/FERROUS FUM/FOLIC AC 28MG-0.8MG
TABLET ORAL
Refills: 0 | Status: DISCONTINUED | COMMUNITY
End: 2020-06-02

## 2020-06-03 ENCOUNTER — NON-APPOINTMENT (OUTPATIENT)
Age: 85
End: 2020-06-03

## 2020-06-03 ENCOUNTER — APPOINTMENT (OUTPATIENT)
Dept: CARDIOLOGY | Facility: CLINIC | Age: 85
End: 2020-06-03
Payer: MEDICARE

## 2020-06-03 VITALS
SYSTOLIC BLOOD PRESSURE: 130 MMHG | BODY MASS INDEX: 25.32 KG/M2 | HEIGHT: 60 IN | WEIGHT: 129 LBS | HEART RATE: 61 BPM | DIASTOLIC BLOOD PRESSURE: 70 MMHG

## 2020-06-03 PROCEDURE — 99215 OFFICE O/P EST HI 40 MIN: CPT

## 2020-06-03 PROCEDURE — 93000 ELECTROCARDIOGRAM COMPLETE: CPT

## 2020-06-03 NOTE — ASSESSMENT
[FreeTextEntry1] : EKG 6/3/2020.  Sinus rhythm with a–V pacing.  Magnet rate equals 85, nominal\par EKG 6/12/19. Dual-chamber pacemaker. VPC. Magnet rate nominal 85

## 2020-06-03 NOTE — DISCUSSION/SUMMARY
[FreeTextEntry1] : Brief recommendations and follow-up: (see above for details)\par \par The patient is doing extremely well.\par She notes some lightheadedness but thankfully no fainting.\par I will recheck her valve status with an echocardiogram.\par The remainder of her medical regimen remains unchanged.\par I congratulated her and encouraged her regarding her active lifestyle.\par Her main concern is regarding her balance.  She did have previous consultation with Dr. Sarmiento\par Next routine cardiology visit 6 months\par

## 2020-06-03 NOTE — REASON FOR VISIT
[FreeTextEntry1] : Ms. BILLY AREVALO has the following complex problem list:\par \par Coronary artery disease status post bypass surgery 2015 with previous stent.\par History of complete heart block treated with pacemaker therapy. Medtronic device.\par Valvular heart disease.\par History of subarachnoid hemorrhage.\par Dyslipidemia.\par Cardiac arrhythmia/ventricular tachycardia.\par Carotid atherosclerosis.\par Peripheral vascular disease with right lower extremity stent, a complication of her iliac artery dissection during angiography.\par Type 2 diabetes\par \par She has additional medical problems as noted.\par \par Her primary care physician is Dr. Pandey.\par \par

## 2020-06-03 NOTE — HISTORY OF PRESENT ILLNESS
[FreeTextEntry1] : This 89 year-old female patient presents for cardiovascular evaluation.\par \par Her problem list is as noted above.\par \par Insert last examination approximately 1 year ago she reports no major events or hospitalizations.  She remains with some chronic imbalance but thankfully no fainting or falls.  No acute symptoms of shortness of breath, chest discomfort, palpitation.\par \par She had a evaluation with our nurse practitioner a number of months ago for lightheadedness.\par She had a transtelephonic pacemaker evaluation in March.\par She had a telephonic evaluation with her primary care physician.\par \par She tries to stay active walking the dog 3 times a day and she cares for her  Krishna.

## 2020-06-25 ENCOUNTER — APPOINTMENT (OUTPATIENT)
Dept: CARDIOLOGY | Facility: CLINIC | Age: 85
End: 2020-06-25
Payer: MEDICARE

## 2020-07-20 ENCOUNTER — RESULT REVIEW (OUTPATIENT)
Age: 85
End: 2020-07-20

## 2020-07-22 ENCOUNTER — APPOINTMENT (OUTPATIENT)
Dept: GERIATRICS | Facility: CLINIC | Age: 85
End: 2020-07-22
Payer: MEDICARE

## 2020-07-22 PROCEDURE — 99214 OFFICE O/P EST MOD 30 MIN: CPT | Mod: 95

## 2020-07-22 NOTE — REASON FOR VISIT
[Home] : at home, [unfilled] , at the time of the visit. [Medical Office: ([facility])___] : at the medical office located in  [Verbal consent obtained from patient] : the patient, [unfilled] [Follow-Up] : a follow-up visit [Family Member] : family member

## 2020-07-22 NOTE — ASSESSMENT
[FreeTextEntry1] : MRI shows microvascular changes\par discussed she should not be driving\par goal is BP lipid and A1C control\par due for repeat labs\par \par

## 2020-07-22 NOTE — HISTORY OF PRESENT ILLNESS
[FreeTextEntry1] : presenting with daughter Gauri \par several concerns:\par to review MRI Brain\par eating more desserts\par daughters concerned about her driving\par feels memory is "fine" but daughters disagree and want her to stop driving\par in pandemic clearly she has not been able to drive\par  [0] : 2) Feeling down, depressed, or hopeless: Not at all [PHQ-2 Score ___] : PHQ-2 Score [unfilled]

## 2020-07-23 ENCOUNTER — APPOINTMENT (OUTPATIENT)
Dept: CARDIOLOGY | Facility: CLINIC | Age: 85
End: 2020-07-23

## 2020-07-23 ENCOUNTER — APPOINTMENT (OUTPATIENT)
Dept: CARDIOLOGY | Facility: CLINIC | Age: 85
End: 2020-07-23
Payer: MEDICARE

## 2020-07-23 PROCEDURE — 93280 PM DEVICE PROGR EVAL DUAL: CPT

## 2020-07-23 NOTE — PROCEDURE
[Pacemaker] : pacemaker [Complete Heart Block] : complete heart block [DDD] : DDD [None] : none [de-identified] : Medtronic [de-identified] : [employer]visa  MRI A2DR01 [de-identified] : 8/14/2015 [de-identified] : [id number] [de-identified] : 4 years [de-identified] : 60 [de-identified] : normal device function, no events

## 2020-08-11 ENCOUNTER — RX RENEWAL (OUTPATIENT)
Age: 85
End: 2020-08-11

## 2020-10-02 ENCOUNTER — MED ADMIN CHARGE (OUTPATIENT)
Age: 85
End: 2020-10-02

## 2020-10-02 ENCOUNTER — APPOINTMENT (OUTPATIENT)
Dept: GERIATRICS | Facility: CLINIC | Age: 85
End: 2020-10-02
Payer: MEDICARE

## 2020-10-02 PROCEDURE — ZZZZZ: CPT

## 2020-10-09 ENCOUNTER — APPOINTMENT (OUTPATIENT)
Dept: GERIATRICS | Facility: CLINIC | Age: 85
End: 2020-10-09
Payer: MEDICARE

## 2020-10-09 PROCEDURE — 99496 TRANSJ CARE MGMT HIGH F2F 7D: CPT | Mod: 95

## 2020-10-12 NOTE — REVIEW OF SYSTEMS
[Fever] : no fever [Chills] : no chills [Fatigue] : fatigue [Night Sweats] : no night sweats [Pain] : no pain [Itching] : no itching [Nasal Discharge] : no nasal discharge [Sore Throat] : no sore throat [Chest Pain] : no chest pain [Palpitations] : no palpitations [Shortness Of Breath] : no shortness of breath [Wheezing] : no wheezing [Cough] : no cough [Abdominal Pain] : no abdominal pain [Vomiting] : no vomiting [Dysuria] : no dysuria [Joint Pain] : joint pain [Muscle Pain] : muscle pain [Skin Rash] : no skin rash [Memory Loss] : memory loss

## 2020-10-12 NOTE — PHYSICAL EXAM
[No Acute Distress] : no acute distress [Well Nourished] : well nourished [No Respiratory Distress] : no respiratory distress  [No Accessory Muscle Use] : no accessory muscle use [73055 - High Complexity requires an extensive number of possible diagnoses and/or the management options, extensive complexity of the medical data (tests, etc.) to be reviewed, and a high risk of significant complications, morbidity, and/or mortality as w] : High Complexity

## 2020-10-12 NOTE — ASSESSMENT
[FreeTextEntry1] : acute R sided hip pain\par new since hospital dc\par will order for home xray\par continue with home PT\par advised that if she was unhappy with [facility] orthopedics can call her [facility] orthopedics for transfer of care\par tylenol for pain in shoulder and hip - heat BID\par continue BP logs\par erratic readings concern but BP elevated in hospital due to stress anxiety\par and now can bottom out\par prognosis guarded\par hospital bed ordered \par

## 2020-10-12 NOTE — HISTORY OF PRESENT ILLNESS
[Home] : at home, [unfilled] , at the time of the visit. [Medical Office: ([facility])___] : at the medical office located in  [Family Member] : family member [Verbal consent obtained from patient] : the patient, [unfilled] [Post-hospitalization from ___ Hospital] : Post-hospitalization from [unfilled] Hospital [Admitted on: ___] : The patient was admitted on [unfilled] [Discharged on ___] : discharged on [unfilled] [Discharge Med List] : discharge medication list [Patient Contacted By: ____] : and contacted by [unfilled] [FreeTextEntry2] : pt had reported fall which promted inpt admit- this happened to be on the day she had flu shot \par \par DX fall Humerous fx right side - not always wearing sling due for PT today - evaluation\par per daughter CT head all normal\par pt is very weak now having back pain as well\par hospital bed has been ordered \par \par BP erratic and remains this way\par also has new pain and some weakness\par needing help with ADLs due to humeral fracture\par open to changing orthopedics to [facility]\par was not happy with experience at [facility]\par BP was very high\par now on metoprolol 25mg in AM and 50mg in PM\par SBP on log 128-147/60-74\par

## 2020-10-21 ENCOUNTER — APPOINTMENT (OUTPATIENT)
Dept: CARDIOLOGY | Facility: CLINIC | Age: 85
End: 2020-10-21
Payer: MEDICARE

## 2020-10-21 VITALS
SYSTOLIC BLOOD PRESSURE: 130 MMHG | HEART RATE: 6 BPM | WEIGHT: 130 LBS | OXYGEN SATURATION: 96 % | DIASTOLIC BLOOD PRESSURE: 50 MMHG | BODY MASS INDEX: 25.39 KG/M2

## 2020-10-21 VITALS — SYSTOLIC BLOOD PRESSURE: 144 MMHG | DIASTOLIC BLOOD PRESSURE: 60 MMHG

## 2020-10-21 PROCEDURE — 99213 OFFICE O/P EST LOW 20 MIN: CPT

## 2020-10-21 NOTE — HISTORY OF PRESENT ILLNESS
[FreeTextEntry1] : 89 years old female with history of\par Coronory artery disease- CABG 2015 with previous stent\par Complete heart block treated with PPM  (Medtronic)\par Valvular Heart Disease\par History of subarachnoid hemorrhage\par Dyslipidemia\par Cardiac arrhythmia/ventricular tachycardia\par Carotid atherosclerosis\par PVD with RLE stent, complication of her ileac artery dissection during angiography\par Type 2 diabetes

## 2020-10-21 NOTE — PHYSICAL EXAM
[Normal Appearance] : normal appearance [General Appearance - In No Acute Distress] : no acute distress [FreeTextEntry1] : in WC today with family member, can get up with minimal assistance [Respiration, Rhythm And Depth] : normal respiratory rhythm and effort [Auscultation Breath Sounds / Voice Sounds] : lungs were clear to auscultation bilaterally [Heart Rate And Rhythm] : heart rate and rhythm were normal [Edema] : no peripheral edema present [Skin Color & Pigmentation] : normal skin color and pigmentation

## 2020-10-21 NOTE — REASON FOR VISIT
[Follow-Up - Clinic] : a clinic follow-up of [Hypertension] : hypertension [FreeTextEntry1] : Eleni comes today for BP f/u.  S/P fall/syncopal episode 2 weeks ago.  Got up to answer doorbell and lost consciousness and suffered fracture right humerus.  She was hospitalized.  PPM was checked and found to be functioning appropriately.  BP was elevated at times in hospital and metoprolol was adjusted.\par Since home BP readings 129-164 systolic.  She did receive one reading of 205/94.\par Overall she feels well\par Denies CP/SOB/palps

## 2020-11-05 ENCOUNTER — RX RENEWAL (OUTPATIENT)
Age: 85
End: 2020-11-05

## 2020-12-03 ENCOUNTER — NON-APPOINTMENT (OUTPATIENT)
Age: 85
End: 2020-12-03

## 2020-12-07 ENCOUNTER — NON-APPOINTMENT (OUTPATIENT)
Age: 85
End: 2020-12-07

## 2020-12-07 ENCOUNTER — APPOINTMENT (OUTPATIENT)
Dept: CARDIOLOGY | Facility: CLINIC | Age: 85
End: 2020-12-07
Payer: MEDICARE

## 2020-12-07 VITALS — SYSTOLIC BLOOD PRESSURE: 170 MMHG | DIASTOLIC BLOOD PRESSURE: 75 MMHG

## 2020-12-07 VITALS
BODY MASS INDEX: 24.74 KG/M2 | WEIGHT: 126 LBS | DIASTOLIC BLOOD PRESSURE: 70 MMHG | SYSTOLIC BLOOD PRESSURE: 176 MMHG | HEIGHT: 60 IN

## 2020-12-07 PROCEDURE — 99215 OFFICE O/P EST HI 40 MIN: CPT

## 2020-12-07 PROCEDURE — 93000 ELECTROCARDIOGRAM COMPLETE: CPT

## 2020-12-07 RX ORDER — METOPROLOL TARTRATE 25 MG/1
25 TABLET, FILM COATED ORAL
Qty: 270 | Refills: 3 | Status: DISCONTINUED | COMMUNITY
Start: 2019-05-21 | End: 2020-12-07

## 2020-12-07 NOTE — DISCUSSION/SUMMARY
[FreeTextEntry1] : Brief recommendations and follow-up: (see above for details)\par \par The patient remains with a considerable problem list but she is well compensated.\par Her blood pressure could be improved.\par I have changed her short acting metoprolol to Toprol-XL 50 mg twice a day.\par The previously scheduled echocardiogram will be rescheduled.\par She has an upcoming appointment for a device check.  Consider increasing the lower rate limit to 70 at that time.\par Next full cardiology visit with me 6 months.

## 2020-12-07 NOTE — HISTORY OF PRESENT ILLNESS
[FreeTextEntry1] : This 89 year-old female patient presents for cardiovascular evaluation.\par \par Her problem list is as noted above.\par \par Since her last full examination 6 months ago she has had some medical evaluations.  She did suffer a trip and fall with fracture of her right humerus.  She was treated with a sling.  No operation required.  She has made a recovery.\par \par She remains quite active.  She does note some imbalance and uses a cane.  No acute symptoms of shortness of breath, chest discomfort, palpitation, fainting.  She did have a nurse practitioner visit in October for hypertension evaluation.  Blood pressure was in the 140-150/60 range at that time.\par

## 2020-12-07 NOTE — PHYSICAL EXAM
[FreeTextEntry1] : Uses a cane necessary..  Status post right humeral fracture, conservative therapy.

## 2020-12-07 NOTE — ASSESSMENT
[FreeTextEntry1] : EKG 12/7/2020.  Dual-chamber pacing.  Magnet rate equals 85, normal.\par EKG 6/3/2020.  Sinus rhythm with a–V pacing.  Magnet rate equals 85, nominal\par EKG 6/12/19. Dual-chamber pacemaker. VPC. Magnet rate nominal 85

## 2021-01-05 ENCOUNTER — RX RENEWAL (OUTPATIENT)
Age: 86
End: 2021-01-05

## 2021-01-14 ENCOUNTER — RESULT REVIEW (OUTPATIENT)
Age: 86
End: 2021-01-14

## 2021-01-28 ENCOUNTER — APPOINTMENT (OUTPATIENT)
Dept: CARDIOLOGY | Facility: CLINIC | Age: 86
End: 2021-01-28
Payer: MEDICARE

## 2021-01-28 VITALS
TEMPERATURE: 98.4 F | WEIGHT: 127.25 LBS | SYSTOLIC BLOOD PRESSURE: 160 MMHG | HEIGHT: 60 IN | OXYGEN SATURATION: 96 % | HEART RATE: 60 BPM | DIASTOLIC BLOOD PRESSURE: 64 MMHG | BODY MASS INDEX: 24.98 KG/M2

## 2021-01-28 PROCEDURE — 93280 PM DEVICE PROGR EVAL DUAL: CPT

## 2021-01-28 PROCEDURE — 99213 OFFICE O/P EST LOW 20 MIN: CPT

## 2021-01-28 PROCEDURE — 99072 ADDL SUPL MATRL&STAF TM PHE: CPT

## 2021-01-28 RX ORDER — BETAMETHASONE DIPROPIONATE 0.5 MG/G
0.05 LOTION TOPICAL
Qty: 60 | Refills: 0 | Status: COMPLETED | COMMUNITY
Start: 2019-11-20 | End: 2021-01-28

## 2021-01-28 NOTE — DISCUSSION/SUMMARY
[Pacemaker Function Normal] : normal pacemaker function [Patient] : the patient [FreeTextEntry1] : Return in one month for BP check.\par Return in 3 months for device analysis. \par Follow up with Dr. Lakhani on 6/9/21 as scheduled.

## 2021-01-28 NOTE — PROCEDURE
[Complete Heart Block] : complete heart block [DDD] : DDD [Counters Reset] : the counters were reset [de-identified] : Medtronic [de-identified] : Advisa  MRI A2DR01 [de-identified] : [id number] [de-identified] : 8/14/2015 [de-identified] : 60/130 [de-identified] : 3.5 years [de-identified] : lower rate limit increased to 70bpm and rate response turned on [de-identified] : Normal device function. There were 3 ventricular events, NSVT, with longest lasting 12 beats.

## 2021-01-28 NOTE — PHYSICAL EXAM
[Normal Appearance] : normal appearance [Well Groomed] : well groomed [General Appearance - In No Acute Distress] : no acute distress [Heart Rate And Rhythm] : heart rate and rhythm were normal [Heart Sounds] : normal S1 and S2 [Arterial Pulses Normal] : the arterial pulses were normal [Edema] : no peripheral edema present [] : no respiratory distress [Respiration, Rhythm And Depth] : normal respiratory rhythm and effort [Auscultation Breath Sounds / Voice Sounds] : lungs were clear to auscultation bilaterally [Cyanosis, Localized] : no localized cyanosis

## 2021-01-28 NOTE — REVIEW OF SYSTEMS
[Headache] : no headache [Feeling Fatigued] : not feeling fatigued [Shortness Of Breath] : no shortness of breath [Chest Pain] : no chest pain [Palpitations] : no palpitations [Dizziness] : no dizziness [Easy Bleeding] : no tendency for easy bleeding [Easy Bruising] : no tendency for easy bruising

## 2021-01-28 NOTE — HISTORY OF PRESENT ILLNESS
[de-identified] : At last visit, noted with elevated blood pressure and short acting metoprolol changed to metoprolol succinate 50mg BID. Ms. Ford presents for follow up visit and PPM check. She denies chest pain, SOB, palpitations, dizziness or syncope.\par \par 89 year old female with history of complete heart block at time of CABG s/p placement of Medtronic dual chamber pacemaker on 8/14/2015. 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

## 2021-02-23 ENCOUNTER — APPOINTMENT (OUTPATIENT)
Dept: CARDIOLOGY | Facility: CLINIC | Age: 86
End: 2021-02-23

## 2021-03-10 ENCOUNTER — RX RENEWAL (OUTPATIENT)
Age: 86
End: 2021-03-10

## 2021-05-25 DIAGNOSIS — Z23 ENCOUNTER FOR IMMUNIZATION: ICD-10-CM

## 2021-05-25 DIAGNOSIS — Z92.29 PERSONAL HISTORY OF OTHER DRUG THERAPY: ICD-10-CM

## 2021-06-08 ENCOUNTER — NON-APPOINTMENT (OUTPATIENT)
Age: 86
End: 2021-06-08

## 2021-06-08 NOTE — REASON FOR VISIT
[FreeTextEntry1] : Ms. Ford presents for follow up visit. Accompanied by daughter Beth. \par \par Patient c/o dizziness when getting up from sitting position and gait imbalance. She denies chest pain, SOB, palpitations, or syncope.\par  see above

## 2021-06-09 ENCOUNTER — NON-APPOINTMENT (OUTPATIENT)
Age: 86
End: 2021-06-09

## 2021-06-09 ENCOUNTER — APPOINTMENT (OUTPATIENT)
Dept: CARDIOLOGY | Facility: CLINIC | Age: 86
End: 2021-06-09
Payer: MEDICARE

## 2021-06-09 VITALS
SYSTOLIC BLOOD PRESSURE: 130 MMHG | HEART RATE: 74 BPM | BODY MASS INDEX: 25.72 KG/M2 | TEMPERATURE: 88.6 F | WEIGHT: 131 LBS | DIASTOLIC BLOOD PRESSURE: 55 MMHG | HEIGHT: 60 IN

## 2021-06-09 PROCEDURE — 93000 ELECTROCARDIOGRAM COMPLETE: CPT

## 2021-06-09 PROCEDURE — 99215 OFFICE O/P EST HI 40 MIN: CPT

## 2021-06-09 PROCEDURE — 99072 ADDL SUPL MATRL&STAF TM PHE: CPT

## 2021-06-09 NOTE — PHYSICAL EXAM
[Well Developed] : well developed [Well Nourished] : well nourished [No Acute Distress] : no acute distress [Normal Conjunctiva] : normal conjunctiva [Normal Venous Pressure] : normal venous pressure [Normal S1, S2] : normal S1, S2 [No Rub] : no rub [No Gallop] : no gallop [Clear Lung Fields] : clear lung fields [Good Air Entry] : good air entry [No Respiratory Distress] : no respiratory distress  [Soft] : abdomen soft [Non Tender] : non-tender [No Masses/organomegaly] : no masses/organomegaly [Normal Bowel Sounds] : normal bowel sounds [Normal Gait] : normal gait [No Edema] : no edema [No Cyanosis] : no cyanosis [No Clubbing] : no clubbing [No Varicosities] : no varicosities [No Rash] : no rash [No Skin Lesions] : no skin lesions [Moves all extremities] : moves all extremities [No Focal Deficits] : no focal deficits [Normal Speech] : normal speech [Alert and Oriented] : alert and oriented [Normal memory] : normal memory [de-identified] : Carotid bruit versus transmitted murmur. [de-identified] : 1/6 systolic murmur left sternal border.

## 2021-06-09 NOTE — REVIEW OF SYSTEMS
[Negative] : Heme/Lymph [FreeTextEntry2] : Periodic late afternoon fatigue. [FreeTextEntry3] : Bilateral cataract surgery. [FreeTextEntry4] : She has hearing loss. [FreeTextEntry5] : See HPI. [FreeTextEntry7] : He has treated GERD. [FreeTextEntry8] : Nocturia 2 or 3 times.  She does note some urinary frequency. [FreeTextEntry9] : Mild hand and knee arthritis. [de-identified] : Chronic imbalance.  Balance therapy at [place] was not much help.

## 2021-06-09 NOTE — DISCUSSION/SUMMARY
[FreeTextEntry1] : Brief recommendations and follow-up: (see above for details)\par \par The patient remains with a considerable problem list but she is well compensated.\par Specifically no evidence of active angina or congestive heart failure.\par Her blood pressure is actually under good control.\par Magnet rate today is phenomenal.\par She will move back her planned Medtronic clinic visit a couple of months.\par Next full cardiology visit 6 months.\par 40-minute visit.

## 2021-06-09 NOTE — CARDIOLOGY SUMMARY
[de-identified] : Stress test 9/16/15. Nondiagnostic secondary to pacemaker rhythm. No ischemia clinically. 3 minutes 30 seconds. Aidan stage I. Intermediate protocol. 3.5 METs. 67%.\par Stress echo 11/7/2014. Nondiagnostic EKG portion, LBBB. Associated delayed septal activation. No echo or clinical symptoms of angina. 6 minutes. Intermediate\par     protocol. Aidan stage I. 4.7 METs. 89% maximum heart rate.\par  [de-identified] : Echo 12/20/18. Normal LV function. EF 54%. Mild diastolic dysfunction. Dilated left atrium. Mild MAC. Mild MR. Mild AS. Mild AI. Gradient 28/16. Mild TR. No significant  change from 2017\par Echo.12/15/17. Normal LV function. EF 75%, Dilated LA. Mitral thickening. Mild MR.  Mild-mod AS, Gradientt 31/17. JATIN. 0.9. Trace AI. Mild-mod TR. Similar to 2016. [de-identified] : Pacemaker 8/14/2015 at the time of bypass surgery. Doctor Jp. Medtronic.Advisa   Dual-chamber. Model #A2DR01 performed for complete heart block. MRI compatible.  Magnet rate equals 85.\par  [de-identified] :  Angiogram Doctor Timmermans 8/11/15. 90% distal left main. 60% mid LAD. 90%  second diagonal. 90% right PDA. EF 60%. Patent proximal RCA stents. Patent OM1 stent.\par  [de-identified] : CABG: CABG. 8/12/15. LIMA to LAD, SVG to DRCA, SVG to OM1. \par  [de-identified] : Carotid duplex 12/20/18. 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 Doctor Beau\par

## 2021-06-09 NOTE — HISTORY OF PRESENT ILLNESS
[FreeTextEntry1] : This 89 year-old female patient presents for cardiovascular evaluation.\par \par Her problem list is as noted above.\par \par Since her last examination 6 months ago she reports no major events or hospitalizations.  She tries to stay active.  She does note some imbalance.  Thankfully there have been no falls.  She uses a cane.\par \par There have been no acute symptoms of shortness of breath, chest discomfort, palpitation, fainting.\par \par She anticipates a visit with her primary care physician in the near future.  She has had appropriate device follow-up.\par

## 2021-07-06 ENCOUNTER — RX RENEWAL (OUTPATIENT)
Age: 86
End: 2021-07-06

## 2021-07-19 ENCOUNTER — APPOINTMENT (OUTPATIENT)
Dept: GERIATRICS | Facility: CLINIC | Age: 86
End: 2021-07-19
Payer: MEDICARE

## 2021-07-19 VITALS
SYSTOLIC BLOOD PRESSURE: 110 MMHG | OXYGEN SATURATION: 95 % | DIASTOLIC BLOOD PRESSURE: 76 MMHG | BODY MASS INDEX: 25.78 KG/M2 | TEMPERATURE: 98.3 F | WEIGHT: 132 LBS | HEART RATE: 74 BPM

## 2021-07-19 DIAGNOSIS — N39.46 MIXED INCONTINENCE: ICD-10-CM

## 2021-07-19 PROCEDURE — 99397 PER PM REEVAL EST PAT 65+ YR: CPT

## 2021-07-19 PROCEDURE — 99072 ADDL SUPL MATRL&STAF TM PHE: CPT

## 2021-07-19 NOTE — PHYSICAL EXAM
[General Appearance - In No Acute Distress] : in no acute distress [General Appearance - Alert] : alert [General Appearance - Well Nourished] : well nourished [General Appearance - Well Developed] : well developed [Sclera] : the sclera and conjunctiva were normal [PERRL With Normal Accommodation] : pupils were equal in size, round, and reactive to light [Extraocular Movements] : extraocular movements were intact [Normal Oral Mucosa] : normal oral mucosa [No Oral Pallor] : no oral pallor [Neck Appearance] : the appearance of the neck was normal [Respiration, Rhythm And Depth] : normal respiratory rhythm and effort [Exaggerated Use Of Accessory Muscles For Inspiration] : no accessory muscle use [Auscultation Breath Sounds / Voice Sounds] : lungs were clear to auscultation bilaterally [Heart Rate And Rhythm] : heart rate was normal and rhythm regular [Heart Sounds] : normal S1 and S2 [Heart Sounds Gallop] : no gallops [Breast Appearance] : normal in appearance [Breast Palpation Mass] : no palpable masses [Breast Abnormal Lactation (Galactorrhea)] : no nipple discharge [Bowel Sounds] : normal bowel sounds [Abdomen Soft] : soft [Abdomen Tenderness] : non-tender [Cervical Lymph Nodes Enlarged Posterior Bilaterally] : posterior cervical [Cervical Lymph Nodes Enlarged Anterior Bilaterally] : anterior cervical [Supraclavicular Lymph Nodes Enlarged Bilaterally] : supraclavicular [Axillary Lymph Nodes Enlarged Bilaterally] : axillary [No CVA Tenderness] : no ~M costovertebral angle tenderness [No Spinal Tenderness] : no spinal tenderness [Abnormal Walk] : normal gait [FreeTextEntry1] : uses cane [Skin Color & Pigmentation] : normal skin color and pigmentation [] : no rash [Affect] : the affect was normal [Mood] : the mood was normal

## 2021-07-19 NOTE — HISTORY OF PRESENT ILLNESS
[PMH Reviewed and Updated] : past medical history reviewed and updated [PSH Reviewed and Updated] : past surgical history reviewed and updated [Family History Reviewed and Updated] : family history reviewed and updated [Medication and Allergies Reconciled] : medication and allergies reconciled [0] : 0 [Retired] : retired from work [None] : The patient has no concerns about alcohol abuse [Never] : has never used illicit drugs [Compliant with medications] : compliant with medications [Adequate] : adequate [Children] : children [Fully Independent] : fully independent [Does not drive] : does not drive [No history of falls] : no history of falls [Seatbelts] : seatbelts [Bicycle Helmet] : bicycle helmet [Smoke Detectors] : smoke detectors [Carbon Monoxide Detector] : carbon monoxide detector [Over the Past 2 Weeks, Have You Felt Down, Depressed, or Hopeless?] : 1.) Over the past 2 weeks, have you felt down, depressed, or hopeless? No [Over the Past 2 Weeks, Have You Felt Little Interest or Pleasure Doing Things?] : 2.) Over the past 2 weeks, have you felt little interest or pleasure doing things? No [Spouse] : spouse [FreeTextEntry1] : presenting with daughter Gauri  and [place] as well as sick \par \par Discussed that todays BP in office is lower than normal\par admits to anxiety over having aides in home to help with \par dizziness - chronic issue persisting\par has never had vestibular PT\par no longer driving\par

## 2021-07-19 NOTE — ASSESSMENT
[FreeTextEntry1] : will check apex labs \par she will coordinate\par discussed that for dizziness\par I want BP log - BP daily and RTC 8 weeks\par also will send to vestibular tx\par if no improvement then would consider anxiety and consider increasing treatment

## 2021-07-19 NOTE — REVIEW OF SYSTEMS
[Fever] : no fever [Chills] : no chills [Eye Pain] : no eye pain [Red Eyes] : eyes not red [Sore Throat] : no sore throat [Hoarseness] : no hoarseness [Palpitations] : no palpitations [Chest Pain] : no chest pain [Shortness Of Breath] : no shortness of breath [Wheezing] : no wheezing [Abdominal Pain] : no abdominal pain [Vomiting] : no vomiting [Dysuria] : no dysuria [Incontinence] : incontinence [Arthralgias] : no arthralgias [Joint Pain] : no joint pain [Skin Lesions] : no skin lesions [Skin Wound] : no skin wound [Dizziness] : no dizziness [Fainting] : no fainting [Suicidal] : not suicidal [Sleep Disturbances] : no sleep disturbances

## 2021-07-19 NOTE — REASON FOR VISIT
[Subsequent Annual Medicare Wellness Visit] : a subsequent annual Medicare wellness visit [Spouse] : spouse [Family Member] : family member

## 2021-08-26 ENCOUNTER — APPOINTMENT (OUTPATIENT)
Dept: CARDIOLOGY | Facility: CLINIC | Age: 86
End: 2021-08-26
Payer: MEDICARE

## 2021-08-26 VITALS — HEART RATE: 73 BPM | OXYGEN SATURATION: 95 % | DIASTOLIC BLOOD PRESSURE: 60 MMHG | SYSTOLIC BLOOD PRESSURE: 116 MMHG

## 2021-08-26 PROCEDURE — 93280 PM DEVICE PROGR EVAL DUAL: CPT

## 2021-08-26 NOTE — REVIEW OF SYSTEMS
[Fever] : no fever [Headache] : no headache [Chills] : no chills [Feeling Fatigued] : not feeling fatigued [SOB] : no shortness of breath [Chest Discomfort] : no chest discomfort [Lower Ext Edema] : no extremity edema [Palpitations] : no palpitations [Syncope] : no syncope [Dizziness] : no dizziness

## 2021-08-26 NOTE — DISCUSSION/SUMMARY
[Pacemaker Function Normal] : normal pacemaker function [Patient] : the patient [de-identified] : Routine follow up in 6 months. Continue home monitoring.

## 2021-08-26 NOTE — PROCEDURE
[Complete Heart Block] : complete heart block [Pacemaker] : pacemaker [Medtronic] : Medtronic [DDDR] : DDDR [None] : none [Threshold Testing Performed] : Threshold testing was performed [Counters Reset] : the counters were reset [de-identified] : Advisa  MRI A2DR01 [de-identified] : [id number] [de-identified] : 8/14/2015 [de-identified] : 70/130 [de-identified] : 3 years [de-identified] : Normal device function.

## 2021-08-26 NOTE — HISTORY OF PRESENT ILLNESS
Done   [de-identified] : \par 89 year old female with history of complete heart block at time of CABG s/p placement of Medtronic dual chamber pacemaker on 8/14/2015. 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 Ford denies chest pain, SOB, palpitations, dizziness or syncope. She reports recent death of her .

## 2021-08-26 NOTE — PHYSICAL EXAM
[Normal Appearance] : normal appearance [Well Groomed] : well groomed [General Appearance - In No Acute Distress] : no acute distress [Heart Rate And Rhythm] : heart rate and rhythm were normal [Heart Sounds] : normal S1 and S2 [] : no respiratory distress [Auscultation Breath Sounds / Voice Sounds] : lungs were clear to auscultation bilaterally [Respiration, Rhythm And Depth] : normal respiratory rhythm and effort [Cyanosis, Localized] : no localized cyanosis

## 2021-08-26 NOTE — CARDIOLOGY SUMMARY
[de-identified] : 6/9/21 Dual chamber pacing. Magnet rate equals 85, nominal [de-identified] : Stress test 9/16/15. Nondiagnostic secondary to pacemaker rhythm. No ischemia clinically. 3 minutes 30 seconds. Aidan stage I. Intermediate protocol. 3.5 METs. 67%.\par Stress echo 11/7/2014. Nondiagnostic EKG portion, LBBB. Associated delayed septal activation. No echo or clinical symptoms of angina. 6 minutes. Intermediate\par     protocol. Aidan stage I. 4.7 METs. 89% maximum heart rate.\par  [de-identified] : Echo 12/20/18. Normal LV function. EF 54%. Mild diastolic dysfunction. Dilated left atrium. Mild MAC. Mild MR. Mild AS. Mild AI. Gradient 28/16. Mild TR. No significant  change from 2017\par Echo.12/15/17. Normal LV function. EF 75%, Dilated LA. Mitral thickening. Mild MR.  Mild-mod AS, Gradientt 31/17. JATIN. 0.9. Trace AI. Mild-mod TR. Similar to 2016. [de-identified] : Pacemaker 8/14/2015 at the time of bypass surgery. Doctor Jp. Medtronic.Advisa   Dual-chamber. Model #A2DR01 performed for complete heart block. MRI compatible.  Magnet rate equals 85.\par  [de-identified] :  Angiogram Doctor Timmermans 8/11/15. 90% distal left main. 60% mid LAD. 90%  second diagonal. 90% right PDA. EF 60%. Patent proximal RCA stents. Patent OM1 stent.\par  [de-identified] : CABG: CABG. 8/12/15. LIMA to LAD, SVG to DRCA, SVG to OM1. \par  [de-identified] : Carotid duplex 12/20/18. 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 Doctor Beau\par

## 2021-11-16 ENCOUNTER — APPOINTMENT (OUTPATIENT)
Dept: GERIATRICS | Facility: CLINIC | Age: 86
End: 2021-11-16
Payer: MEDICARE

## 2021-11-16 DIAGNOSIS — Z23 ENCOUNTER FOR IMMUNIZATION: ICD-10-CM

## 2021-11-16 PROCEDURE — G0008: CPT

## 2021-11-16 PROCEDURE — 90662 IIV NO PRSV INCREASED AG IM: CPT

## 2021-12-13 ENCOUNTER — NON-APPOINTMENT (OUTPATIENT)
Age: 86
End: 2021-12-13

## 2021-12-14 ENCOUNTER — NON-APPOINTMENT (OUTPATIENT)
Age: 86
End: 2021-12-14

## 2021-12-14 ENCOUNTER — APPOINTMENT (OUTPATIENT)
Dept: CARDIOLOGY | Facility: CLINIC | Age: 86
End: 2021-12-14
Payer: MEDICARE

## 2021-12-14 VITALS
BODY MASS INDEX: 26.11 KG/M2 | WEIGHT: 133 LBS | SYSTOLIC BLOOD PRESSURE: 140 MMHG | HEART RATE: 73 BPM | DIASTOLIC BLOOD PRESSURE: 62 MMHG | HEIGHT: 60 IN

## 2021-12-14 VITALS
WEIGHT: 133 LBS | HEART RATE: 73 BPM | DIASTOLIC BLOOD PRESSURE: 62 MMHG | SYSTOLIC BLOOD PRESSURE: 140 MMHG | BODY MASS INDEX: 26.11 KG/M2 | TEMPERATURE: 98 F | HEIGHT: 60 IN

## 2021-12-14 PROCEDURE — 93000 ELECTROCARDIOGRAM COMPLETE: CPT

## 2021-12-14 PROCEDURE — 99214 OFFICE O/P EST MOD 30 MIN: CPT

## 2021-12-14 NOTE — ASSESSMENT
[FreeTextEntry1] : EKG 12/14/2021.  Dual-chamber pacing.  Magnet rate equals 85, nominal.\par EKG 6/9/2021.  Dual-chamber pacing.  Magnet rate equals 85, nominal\par EKG 12/7/2020.  Dual-chamber pacing.  Magnet rate equals 85, normal.\par EKG 6/3/2020.  Sinus rhythm with a–V pacing.  Magnet rate equals 85, nominal\par EKG 6/12/19. Dual-chamber pacemaker. VPC. Magnet rate nominal 85

## 2021-12-14 NOTE — REVIEW OF SYSTEMS
[Negative] : Constitutional [FreeTextEntry3] : Bilateral cataract surgery. [FreeTextEntry4] : She has hearing loss. [FreeTextEntry5] : See HPI. [FreeTextEntry7] : He has treated GERD. [FreeTextEntry8] : Nocturia 2 or 3 times.  She does note some urinary frequency. [FreeTextEntry9] : Mild hand and knee arthritis. [de-identified] : Chronic imbalance.  Balance therapy at [place] was not much help.

## 2021-12-14 NOTE — HISTORY OF PRESENT ILLNESS
[FreeTextEntry1] : This 89 year-old female patient presents for cardiovascular evaluation.\par \par Her problem list is as noted above.\par \par Since her last examination 6 months ago she reports no major events or hospitalizations.  She stays active.  She uses a cane or a walker but does ambulate.  There are no exertional symptoms.  She continues to note chronic imbalance but thankfully no falls.  Overall she is feeling well.  She has seen her primary care physician and had a pacemaker evaluation since her last assessment.\par

## 2021-12-14 NOTE — DISCUSSION/SUMMARY
[FreeTextEntry1] : Brief recommendations and follow-up: (see above for details)\par \par The patient remains with a considerable problem list but she is well compensated.\par She has a pacemaker appointment scheduled in March.\par Continue current routine.\par Next full cardiology visit 6 months.\par

## 2021-12-14 NOTE — PHYSICAL EXAM
[de-identified] : Carotid bruit versus transmitted murmur. [de-identified] : 1/6 systolic murmur left sternal border. [de-identified] : trace edema

## 2021-12-14 NOTE — CARDIOLOGY SUMMARY
[de-identified] : Stress test 9/16/15. Nondiagnostic secondary to pacemaker rhythm. No ischemia clinically. 3 minutes 30 seconds. Aidan stage I. Intermediate protocol. 3.5 METs. 67%.\par Stress echo 11/7/2014. Nondiagnostic EKG portion, LBBB. Associated delayed septal activation. No echo or clinical symptoms of angina. 6 minutes. Intermediate\par     protocol. Aidan stage I. 4.7 METs. 89% maximum heart rate.\par  [de-identified] : Echo 12/20/18. Normal LV function. EF 54%. Mild diastolic dysfunction. Dilated left atrium. Mild MAC. Mild MR. Mild AS. Mild AI. Gradient 28/16. Mild TR. No significant  change from 2017\par Echo.12/15/17. Normal LV function. EF 75%, Dilated LA. Mitral thickening. Mild MR.  Mild-mod AS, Gradientt 31/17. JATIN. 0.9. Trace AI. Mild-mod TR. Similar to 2016. [de-identified] : Pacemaker 8/14/2015 at the time of bypass surgery. Doctor Jp. Medtronic.Advisa   Dual-chamber. Model #A2DR01 performed for complete heart block. MRI compatible.  Magnet rate equals 85.\par  [de-identified] :  Angiogram Doctor Timmermans 8/11/15. 90% distal left main. 60% mid LAD. 90%  second diagonal. 90% right PDA. EF 60%. Patent proximal RCA stents. Patent OM1 stent.\par  [de-identified] : CABG: CABG. 8/12/15. LIMA to LAD, SVG to DRCA, SVG to OM1. \par  [de-identified] : Carotid duplex 12/20/18. 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 Doctor Beau\par

## 2022-03-24 ENCOUNTER — APPOINTMENT (OUTPATIENT)
Dept: CARDIOLOGY | Facility: CLINIC | Age: 87
End: 2022-03-24

## 2022-03-24 ENCOUNTER — APPOINTMENT (OUTPATIENT)
Dept: CARDIOLOGY | Facility: CLINIC | Age: 87
End: 2022-03-24
Payer: MEDICARE

## 2022-03-24 VITALS — SYSTOLIC BLOOD PRESSURE: 124 MMHG | DIASTOLIC BLOOD PRESSURE: 62 MMHG | HEART RATE: 79 BPM | OXYGEN SATURATION: 97 %

## 2022-03-24 PROCEDURE — 93280 PM DEVICE PROGR EVAL DUAL: CPT

## 2022-03-24 NOTE — CARDIOLOGY SUMMARY
[de-identified] : 12/14/21 Dual chamber pacing. Magnet rate equals 85, nominal. [de-identified] : Echo 12/20/18. Normal LV function. EF 54%. Mild diastolic dysfunction. Dilated left atrium. Mild MAC. Mild MR. Mild AS. Mild AI. Gradient 28/16. Mild TR. No significant  change from 2017\par Echo.12/15/17. Normal LV function. EF 75%, Dilated LA. Mitral thickening. Mild MR.  Mild-mod AS, Gradientt 31/17. JATIN. 0.9. Trace AI. Mild-mod TR. Similar to 2016. [de-identified] : Stress test 9/16/15. Nondiagnostic secondary to pacemaker rhythm. No ischemia clinically. 3 minutes 30 seconds. Aidan stage I. Intermediate protocol. 3.5 METs. 67%.\par Stress echo 11/7/2014. Nondiagnostic EKG portion, LBBB. Associated delayed septal activation. No echo or clinical symptoms of angina. 6 minutes. Intermediate\par     protocol. Aidan stage I. 4.7 METs. 89% maximum heart rate.\par  [de-identified] : Pacemaker 8/14/2015 at the time of bypass surgery. Doctor Jp. Medtronic.Advisa   Dual-chamber. Model #A2DR01 performed for complete heart block. MRI compatible.  Magnet rate equals 85.\par  [de-identified] :  Angiogram Doctor Timmermans 8/11/15. 90% distal left main. 60% mid LAD. 90%  second diagonal. 90% right PDA. EF 60%. Patent proximal RCA stents. Patent OM1 stent.\par  [de-identified] : CABG: CABG. 8/12/15. LIMA to LAD, SVG to DRCA, SVG to OM1. \par  [de-identified] : Carotid duplex 12/20/18. 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 Doctor Beau\par

## 2022-03-24 NOTE — DISCUSSION/SUMMARY
[Pacemaker Function Normal] : normal pacemaker function [Patient] : the patient [de-identified] : Continue remote monitoring, return for full device analysis in 6 months

## 2022-03-24 NOTE — HISTORY OF PRESENT ILLNESS
[de-identified] : \par 89 year old female with history of complete heart block at time of CABG s/p placement of Medtronic dual chamber pacemaker on 8/14/2015. 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

## 2022-03-24 NOTE — PROCEDURE
[Complete Heart Block] : complete heart block [Pacemaker] : pacemaker [Medtronic] : Medtronic [DDDR] : DDDR [Threshold Testing Performed] : Threshold testing was performed [None] : none [Counters Reset] : the counters were reset [de-identified] : Advisa  MRI A2DR01 [de-identified] : [id number] [de-identified] : 8/14/2015 [de-identified] : 70/130 [de-identified] : 2.5 years [de-identified] : Normal device function. \par 6 ventricular events, nonsustained, longest episode lasted 4 seconds.

## 2022-06-14 ENCOUNTER — NON-APPOINTMENT (OUTPATIENT)
Age: 87
End: 2022-06-14

## 2022-06-15 ENCOUNTER — APPOINTMENT (OUTPATIENT)
Dept: CARDIOLOGY | Facility: CLINIC | Age: 87
End: 2022-06-15
Payer: MEDICARE

## 2022-06-15 ENCOUNTER — NON-APPOINTMENT (OUTPATIENT)
Age: 87
End: 2022-06-15

## 2022-06-15 VITALS
WEIGHT: 136 LBS | SYSTOLIC BLOOD PRESSURE: 124 MMHG | HEART RATE: 86 BPM | OXYGEN SATURATION: 95 % | BODY MASS INDEX: 26.7 KG/M2 | DIASTOLIC BLOOD PRESSURE: 70 MMHG | HEIGHT: 60 IN | TEMPERATURE: 98 F

## 2022-06-15 PROCEDURE — 99215 OFFICE O/P EST HI 40 MIN: CPT

## 2022-06-15 PROCEDURE — 93000 ELECTROCARDIOGRAM COMPLETE: CPT

## 2022-06-15 NOTE — PHYSICAL EXAM
[de-identified] : Carotid bruit versus transmitted murmur. [de-identified] : 2/6 systolic murmur left sternal border. [de-identified] : Uses a cane.  Imbalance. [de-identified] : trace edema

## 2022-06-15 NOTE — DISCUSSION/SUMMARY
[FreeTextEntry1] : Brief recommendations and follow-up: (see above for details)\par \par Patient remains with a considerable cardiovascular and medical problem list.\par Nevertheless she is well compensated and doing well.\par Medications reviewed and reconciled.\par She anticipates upcoming blood work and a comprehensive physical.\par Valve abnormalities reviewed and an echocardiogram will be ordered.\par Pacemaker nominal and this will be followed regularly.\par Next routine cardiology visit 6 months.\par Today's visit 45 minutes.\par Move back her pacemaker visit 3 months from now.\par

## 2022-06-15 NOTE — ASSESSMENT
[FreeTextEntry1] : EKG 6/15/2022.  Dual-chamber pacing.  Magnet rate equals 85, nominal.\par EKG 12/14/2021.  Dual-chamber pacing.  Magnet rate equals 85, nominal.\par EKG 6/9/2021.  Dual-chamber pacing.  Magnet rate equals 85, nominal\par EKG 12/7/2020.  Dual-chamber pacing.  Magnet rate equals 85, normal.\par EKG 6/3/2020.  Sinus rhythm with a–V pacing.  Magnet rate equals 85, nominal\par EKG 6/12/19. Dual-chamber pacemaker. VPC. Magnet rate nominal 85

## 2022-06-15 NOTE — CARDIOLOGY SUMMARY
[de-identified] : Echo 1/15/2021.  A.  F.  Normal LV function.  EF 70%.  LVH.  Mild diastolic dysfunction.  MAC.  Mild MR.  Mild or moderate AS.  Gradient 32/15.  Trace AI.  Similar to 2018 study.\par \par Echo 12/20/18. Normal LV function. EF 54%. Mild diastolic dysfunction. Dilated left atrium. Mild MAC. Mild MR. Mild AS. Mild AI. Gradient 28/16. Mild TR. No significant  change from 2017\par Echo.12/15/17. Normal LV function. EF 75%, Dilated LA. Mitral thickening. Mild MR.  Mild-mod AS, Gradientt 31/17. JATIN. 0.9. Trace AI. Mild-mod TR. Similar to 2016. [de-identified] : Stress test 9/16/15. Nondiagnostic secondary to pacemaker rhythm. No ischemia clinically. 3 minutes 30 seconds. Aidan stage I. Intermediate protocol. 3.5 METs. 67%.\par Stress echo 11/7/2014. Nondiagnostic EKG portion, LBBB. Associated delayed septal activation. No echo or clinical symptoms of angina. 6 minutes. Intermediate\par     protocol. Aidan stage I. 4.7 METs. 89% maximum heart rate.\par  [de-identified] : Pacemaker 8/14/2015 at the time of bypass surgery. Doctor Jp. Medtronic.Advisa   Dual-chamber. Model #A2DR01 performed for complete heart block. MRI compatible.  Magnet rate equals 85.\par  [de-identified] :  Angiogram Doctor Timmermans 8/11/15. 90% distal left main. 60% mid LAD. 90%  second diagonal. 90% right PDA. EF 60%. Patent proximal RCA stents. Patent OM1 stent.\par  [de-identified] : CABG: CABG. 8/12/15. LIMA to LAD, SVG to DRCA, SVG to OM1. \par  [de-identified] : Carotid duplex 12/20/18. 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 Doctor Beau\par

## 2022-06-15 NOTE — REVIEW OF SYSTEMS
[FreeTextEntry3] : Bilateral cataract surgery. [FreeTextEntry4] : She has hearing loss. [FreeTextEntry5] : See HPI. [FreeTextEntry7] : He has treated GERD. [FreeTextEntry8] : Nocturia 2 or 3 times.  She does note some urinary frequency. [FreeTextEntry9] : Mild hand and knee arthritis. [de-identified] : Chronic imbalance.  Balance therapy at [place] was not much help.

## 2022-06-15 NOTE — HISTORY OF PRESENT ILLNESS
[FreeTextEntry1] : This 89 year-old female patient presents for cardiovascular evaluation.\par \par Her problem list is as noted above.\par \par Since her last full examination 6 months ago she reports no major events or hospitalizations.  She did have a device check that demonstrated normal function.  Device longevity is in the 2 to 3-year range.  We are following this regularly.\par \par The patient stays remarkably active for her age.  She continues to note chronic, imbalance.  No fainting.  She does use a cane.  Balance therapy was not that effective.\par \par There are no acute symptoms of chest discomfort, shortness of breath, palpitation.\par \par

## 2022-06-21 RX ORDER — ASPIRIN 81 MG/1
81 TABLET ORAL DAILY
Qty: 90 | Refills: 3 | Status: DISCONTINUED | COMMUNITY
Start: 2021-12-14 | End: 2022-06-21

## 2022-07-25 ENCOUNTER — APPOINTMENT (OUTPATIENT)
Dept: GERIATRICS | Facility: CLINIC | Age: 87
End: 2022-07-25

## 2022-07-25 ENCOUNTER — LABORATORY RESULT (OUTPATIENT)
Age: 87
End: 2022-07-25

## 2022-07-25 VITALS
DIASTOLIC BLOOD PRESSURE: 70 MMHG | TEMPERATURE: 96.9 F | WEIGHT: 134 LBS | BODY MASS INDEX: 26.17 KG/M2 | SYSTOLIC BLOOD PRESSURE: 130 MMHG | OXYGEN SATURATION: 96 % | HEART RATE: 77 BPM

## 2022-07-25 DIAGNOSIS — Z63.6 DEPENDENT RELATIVE NEEDING CARE AT HOME: ICD-10-CM

## 2022-07-25 PROCEDURE — 99397 PER PM REEVAL EST PAT 65+ YR: CPT | Mod: 25

## 2022-07-25 PROCEDURE — 36415 COLL VENOUS BLD VENIPUNCTURE: CPT

## 2022-07-25 SDOH — SOCIAL STABILITY - SOCIAL INSECURITY: DEPENDENT RELATIVE NEEDING CARE AT HOME: Z63.6

## 2022-07-25 NOTE — HISTORY OF PRESENT ILLNESS
[0] : 0 [Fully Independent] : fully independent [No falls in past year] : Patient reported no falls in the past year [Completely Independent] : Completely independent. [Cane] : cane [Smoke Detector] : smoke detector [Carbon Monoxide Detector] : carbon monoxide detector [1] : 2) Feeling down, depressed, or hopeless for several days (1) [PMH Reviewed and Updated] : past medical history reviewed and updated [PSH Reviewed and Updated] : past surgical history reviewed and updated [Family History Reviewed and Updated] : family history reviewed and updated [Medication and Allergies Reconciled] : medication and allergies reconciled [Retired] : retired from work [Compliant with medications] : compliant with medications [Unable To Manage Meds] : ability to manage ~his/her~ medications [Adequate] : adequate [Children] : children [Does not drive] : does not drive [Seatbelts] : seatbelts [de-identified] : [facility]  [FreeTextEntry1] : presenting with daughter [place] today - daughter Gauri is also very active in care\par doing well\par continues to paint daily\par enjoys spending time with her doc "Baby" (Havanese dog)\par continues to have balance issues\par wants information for new dermatologist due to dry skin\par appropriately not driving\par turned 89 this month [PHQ-2 Negative - No further assessment needed] : PHQ-2 Negative - No further assessment needed [[id number]] : 2

## 2022-07-25 NOTE — REVIEW OF SYSTEMS
[Fever] : no fever [Chills] : no chills [Eye Pain] : no eye pain [Red Eyes] : eyes not red [Sore Throat] : no sore throat [Hoarseness] : no hoarseness [Chest Pain] : no chest pain [Palpitations] : no palpitations [Shortness Of Breath] : no shortness of breath [Wheezing] : no wheezing [Abdominal Pain] : no abdominal pain [Vomiting] : no vomiting [Dysuria] : no dysuria [Incontinence] : incontinence [Arthralgias] : no arthralgias [Skin Lesions] : no skin lesions [Skin Wound] : no skin wound [Dizziness] : no dizziness [Fainting] : no fainting [Suicidal] : not suicidal [Sleep Disturbances] : no sleep disturbances

## 2022-07-25 NOTE — ASSESSMENT
[FreeTextEntry1] : will check labs\par labs drawn in office\par BP is well controlled and apparently she did not take AM dose yet\par will watch for symptoms of hypotension\par refer to [place] [facility] for skin check\par advise hydration\par but skin shows signs of aging - seborrheic keratosis.actiniic keratosis - "dry skin" that she should have addressed\par doing very well at 89 \par

## 2022-07-29 LAB
25(OH)D3 SERPL-MCNC: 53.9 NG/ML
ALBUMIN SERPL ELPH-MCNC: 4.8 G/DL
ALP BLD-CCNC: 86 U/L
ALT SERPL-CCNC: 18 U/L
ANION GAP SERPL CALC-SCNC: 12 MMOL/L
APPEARANCE: CLEAR
AST SERPL-CCNC: 28 U/L
BASOPHILS # BLD AUTO: 0.06 K/UL
BASOPHILS NFR BLD AUTO: 0.9 %
BILIRUB SERPL-MCNC: 0.4 MG/DL
BILIRUBIN URINE: NEGATIVE
BLOOD URINE: NEGATIVE
BUN SERPL-MCNC: 16 MG/DL
CALCIUM SERPL-MCNC: 9.7 MG/DL
CHLORIDE SERPL-SCNC: 100 MMOL/L
CHOLEST SERPL-MCNC: 148 MG/DL
CO2 SERPL-SCNC: 26 MMOL/L
COLOR: YELLOW
CREAT SERPL-MCNC: 0.83 MG/DL
EGFR: 65 ML/MIN/1.73M2
EOSINOPHIL # BLD AUTO: 0.26 K/UL
EOSINOPHIL NFR BLD AUTO: 3.8 %
ESTIMATED AVERAGE GLUCOSE: 148 MG/DL
GLUCOSE QUALITATIVE U: NEGATIVE
GLUCOSE SERPL-MCNC: 117 MG/DL
HBA1C MFR BLD HPLC: 6.8 %
HCT VFR BLD CALC: 43.5 %
HDLC SERPL-MCNC: 51 MG/DL
HGB BLD-MCNC: 14.1 G/DL
IMM GRANULOCYTES NFR BLD AUTO: 0.3 %
KETONES URINE: NEGATIVE
LDLC SERPL CALC-MCNC: 62 MG/DL
LEUKOCYTE ESTERASE URINE: ABNORMAL
LYMPHOCYTES # BLD AUTO: 1.86 K/UL
LYMPHOCYTES NFR BLD AUTO: 27.2 %
MAN DIFF?: NORMAL
MCHC RBC-ENTMCNC: 30.9 PG
MCHC RBC-ENTMCNC: 32.4 GM/DL
MCV RBC AUTO: 95.4 FL
MONOCYTES # BLD AUTO: 0.64 K/UL
MONOCYTES NFR BLD AUTO: 9.3 %
NEUTROPHILS # BLD AUTO: 4.01 K/UL
NEUTROPHILS NFR BLD AUTO: 58.5 %
NITRITE URINE: NEGATIVE
NONHDLC SERPL-MCNC: 97 MG/DL
PH URINE: 6.5
PLATELET # BLD AUTO: 261 K/UL
POTASSIUM SERPL-SCNC: 4.5 MMOL/L
PROT SERPL-MCNC: 7.5 G/DL
PROTEIN URINE: ABNORMAL
RBC # BLD: 4.56 M/UL
RBC # FLD: 13.2 %
SODIUM SERPL-SCNC: 138 MMOL/L
SPECIFIC GRAVITY URINE: 1.02
TRIGL SERPL-MCNC: 176 MG/DL
TSH SERPL-ACNC: 4.12 UIU/ML
UROBILINOGEN URINE: NORMAL
VIT B12 SERPL-MCNC: 1263 PG/ML
WBC # FLD AUTO: 6.85 K/UL

## 2022-08-23 ENCOUNTER — APPOINTMENT (OUTPATIENT)
Dept: CARDIOLOGY | Facility: CLINIC | Age: 87
End: 2022-08-23

## 2022-08-25 ENCOUNTER — APPOINTMENT (OUTPATIENT)
Dept: CARDIOLOGY | Facility: CLINIC | Age: 87
End: 2022-08-25

## 2022-09-09 ENCOUNTER — NON-APPOINTMENT (OUTPATIENT)
Age: 87
End: 2022-09-09

## 2022-09-12 ENCOUNTER — APPOINTMENT (OUTPATIENT)
Dept: CARDIOLOGY | Facility: CLINIC | Age: 87
End: 2022-09-12

## 2022-09-12 PROCEDURE — 93306 TTE W/DOPPLER COMPLETE: CPT

## 2022-09-22 ENCOUNTER — APPOINTMENT (OUTPATIENT)
Dept: CARDIOLOGY | Facility: CLINIC | Age: 87
End: 2022-09-22

## 2022-09-22 VITALS
SYSTOLIC BLOOD PRESSURE: 122 MMHG | WEIGHT: 134 LBS | HEART RATE: 73 BPM | DIASTOLIC BLOOD PRESSURE: 56 MMHG | OXYGEN SATURATION: 95 % | BODY MASS INDEX: 26.31 KG/M2 | HEIGHT: 60 IN

## 2022-09-22 PROCEDURE — 93280 PM DEVICE PROGR EVAL DUAL: CPT

## 2022-09-22 NOTE — REVIEW OF SYSTEMS
[Feeling Fatigued] : feeling fatigued [SOB] : no shortness of breath [Chest Discomfort] : no chest discomfort [Palpitations] : no palpitations [Syncope] : no syncope [Dizziness] : no dizziness

## 2022-09-22 NOTE — PHYSICAL EXAM
[Normal Appearance] : normal appearance [Well Groomed] : well groomed [General Appearance - In No Acute Distress] : no acute distress [Heart Rate And Rhythm] : heart rate and rhythm were normal [Heart Sounds] : normal S1 and S2 [] : no respiratory distress [Respiration, Rhythm And Depth] : normal respiratory rhythm and effort [Auscultation Breath Sounds / Voice Sounds] : lungs were clear to auscultation bilaterally [FreeTextEntry1] : no edema

## 2022-09-22 NOTE — PROCEDURE
[Complete Heart Block] : complete heart block [Pacemaker] : pacemaker [Medtronic] : Medtronic [DDDR] : DDDR [Threshold Testing Performed] : Threshold testing was performed [None] : none [Counters Reset] : the counters were reset [de-identified] : Advisa  MRI A2DR01 [de-identified] : [id number] [de-identified] : 8/14/2015 [de-identified] : 70/130 [de-identified] : 2 years [de-identified] : Normal device function. 6 brief nonsustained ventricular events with longest lasting 2 seconds.

## 2022-09-22 NOTE — HISTORY OF PRESENT ILLNESS
[de-identified] : \par 89 year old female with history of complete heart block at time of CABG s/p placement of Medtronic dual chamber pacemaker on 8/14/2015. 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 Ford denies chest pain, SOB, palpitations, dizziness or syncope. She reports feeling tired. She uses a cane. \par \par

## 2022-09-22 NOTE — DISCUSSION/SUMMARY
[Pacemaker Function Normal] : normal pacemaker function [Patient] : the patient [Family] : the patient's family [de-identified] : Device analysis in 6 months. Continue remote monitoring

## 2022-09-22 NOTE — CARDIOLOGY SUMMARY
[de-identified] : 6/15/22 Dual chamber pacing. Magnet rate equals 85, nominal.  [de-identified] : Stress test 9/16/15. Nondiagnostic secondary to pacemaker rhythm. No ischemia clinically. 3 minutes 30 seconds. Aidan stage I. Intermediate protocol. 3.5 METs. 67%.  [de-identified] : Echo 9/12/2022.  Normal LV function.  EF 70%.  Mild diastolic dysfunction.  Mitral thickening and annular calcification.  Mild MR.  Calcified aortic leaflets with overall mild restricted excursion.  Mild to moderate AS.  Peak/mean gradient 37/22.  JATIN 1.36.  Trace TR.  Normal PA, 23 mmHg.  Mildly dilated LA.  No change from 1/15/2021. [de-identified] : Pacemaker 8/14/2015 at the time of bypass surgery. Doctor Jp. Medtronic.Advisa   Dual-chamber. Model #A2DR01 performed for complete heart block. MRI compatible.  Magnet rate equals 85.\par  [de-identified] :  Angiogram Doctor Timmermans 8/11/15. 90% distal left main. 60% mid LAD. 90%  second diagonal. 90% right PDA. EF 60%. Patent proximal RCA stents. Patent OM1 stent.\par  [de-identified] : CABG: CABG. 8/12/15. LIMA to LAD, SVG to DRCA, SVG to OM1. \par  [de-identified] : Carotid duplex 12/20/18. 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 Doctor Beau\par

## 2022-10-31 ENCOUNTER — RX RENEWAL (OUTPATIENT)
Age: 87
End: 2022-10-31

## 2022-11-21 ENCOUNTER — APPOINTMENT (OUTPATIENT)
Dept: GERIATRICS | Facility: CLINIC | Age: 87
End: 2022-11-21

## 2022-11-21 VITALS
WEIGHT: 132 LBS | HEIGHT: 60 IN | DIASTOLIC BLOOD PRESSURE: 70 MMHG | BODY MASS INDEX: 25.91 KG/M2 | HEART RATE: 83 BPM | OXYGEN SATURATION: 95 % | SYSTOLIC BLOOD PRESSURE: 144 MMHG

## 2022-11-21 DIAGNOSIS — Z11.1 ENCOUNTER FOR SCREENING FOR RESPIRATORY TUBERCULOSIS: ICD-10-CM

## 2022-11-21 DIAGNOSIS — M54.50 LOW BACK PAIN, UNSPECIFIED: ICD-10-CM

## 2022-11-21 PROCEDURE — 99214 OFFICE O/P EST MOD 30 MIN: CPT | Mod: 25

## 2022-11-21 PROCEDURE — 36415 COLL VENOUS BLD VENIPUNCTURE: CPT

## 2022-11-21 RX ORDER — UBIDECARENONE/VIT E ACET 100MG-5
50 MCG CAPSULE ORAL
Qty: 90 | Refills: 3 | Status: COMPLETED | COMMUNITY
Start: 2020-07-27 | End: 2022-11-21

## 2022-11-21 NOTE — REVIEW OF SYSTEMS
[Incontinence] : incontinence [Negative] : Psychiatric [FreeTextEntry3] : Cloudiness in right eye [de-identified] : Balance issues

## 2022-11-21 NOTE — HISTORY OF PRESENT ILLNESS
[Family Member] : family member [FreeTextEntry1] : [facility] [address] [de-identified] : 89 year old female with a history of depression, dyslipidemia, vascular dementia, HTN, DM2, valvular heart disease, CAD who presents today for completion of paperwork for [facility]. \par \octavio Will be moving to [place].  Happy as she can keep her dog.  A bit worried about the move as she will also need to set up a  etc. \par \octavio Continues to have issues with balance.  Uses a walker outside of home for stability.  Has live in aid who helps with things around the home.  She can complete her ADLs independently.  \par \par Denies any cardiac or pulmonary symptoms.

## 2022-11-27 ENCOUNTER — RX RENEWAL (OUTPATIENT)
Age: 87
End: 2022-11-27

## 2022-11-28 ENCOUNTER — RX RENEWAL (OUTPATIENT)
Age: 87
End: 2022-11-28

## 2022-11-28 LAB
M TB IFN-G BLD-IMP: NEGATIVE
QUANTIFERON TB PLUS MITOGEN MINUS NIL: 7.19 IU/ML
QUANTIFERON TB PLUS NIL: 0.02 IU/ML
QUANTIFERON TB PLUS TB1 MINUS NIL: 0.11 IU/ML
QUANTIFERON TB PLUS TB2 MINUS NIL: 0.09 IU/ML

## 2022-12-01 ENCOUNTER — APPOINTMENT (OUTPATIENT)
Dept: GERIATRICS | Facility: CLINIC | Age: 87
End: 2022-12-01

## 2022-12-13 ENCOUNTER — NON-APPOINTMENT (OUTPATIENT)
Age: 87
End: 2022-12-13

## 2022-12-14 ENCOUNTER — APPOINTMENT (OUTPATIENT)
Dept: CARDIOLOGY | Facility: CLINIC | Age: 87
End: 2022-12-14

## 2022-12-14 VITALS
SYSTOLIC BLOOD PRESSURE: 140 MMHG | OXYGEN SATURATION: 97 % | WEIGHT: 133 LBS | HEIGHT: 60 IN | BODY MASS INDEX: 26.11 KG/M2 | HEART RATE: 87 BPM | DIASTOLIC BLOOD PRESSURE: 70 MMHG

## 2022-12-14 PROCEDURE — 99215 OFFICE O/P EST HI 40 MIN: CPT | Mod: 25

## 2022-12-14 PROCEDURE — 93000 ELECTROCARDIOGRAM COMPLETE: CPT

## 2022-12-14 NOTE — PHYSICAL EXAM
[de-identified] : Carotid bruit versus transmitted murmur. [de-identified] : 2/6 systolic murmur left sternal border. [de-identified] : Uses a cane Imbalance. [de-identified] : trace edema

## 2022-12-14 NOTE — CARDIOLOGY SUMMARY
[de-identified] : Stress test 9/16/15. Nondiagnostic secondary to pacemaker rhythm. No ischemia clinically. 3 minutes 30 seconds. Aidan stage I. Intermediate protocol. 3.5 METs. 67%.\par Stress echo 11/7/2014. Nondiagnostic EKG portion, LBBB. Associated delayed septal activation. No echo or clinical symptoms of angina. 6 minutes. Intermediate\par     protocol. Aidan stage I. 4.7 METs. 89% maximum heart rate.\par  [de-identified] : Echo 9/12/2022.  Normal LV function.  EF 70%.  Mild diastolic dysfunction.  Mitral thickening and annular calcification.  Mild MR.  Calcified aortic leaflets with overall mild restricted excursion.  Mild to moderate AS.  Peak/mean gradient 37/22.  JATIN 1.36.  Trace TR.  Normal PA, 23 mmHg.  Mildly dilated LA.  No change from 1/15/2021.\par \par Echo 1/15/2021.  A.  F.  Normal LV function.  EF 70%.  LVH.  Mild diastolic dysfunction.  MAC.  Mild MR.  Mild or moderate AS.  Gradient 32/15.  Trace AI.  Similar to 2018 study.\par \par Echo 12/20/18. Normal LV function. EF 54%. Mild diastolic dysfunction. Dilated left atrium. Mild MAC. Mild MR. Mild AS. Mild AI. Gradient 28/16. Mild TR. No significant  change from 2017\par Echo.12/15/17. Normal LV function. EF 75%, Dilated LA. Mitral thickening. Mild MR.  Mild-mod AS, Gradientt 31/17. JATIN. 0.9. Trace AI. Mild-mod TR. Similar to 2016. [de-identified] : Pacemaker 8/14/2015 at the time of bypass surgery. Doctor Jp. Medtronic.Advisa   Dual-chamber. Model #A2DR01 performed for complete heart block. MRI compatible.  Magnet rate equals 85.\par  [de-identified] :  Angiogram Doctor Timmermans 8/11/15. 90% distal left main. 60% mid LAD. 90%  second diagonal. 90% right PDA. EF 60%. Patent proximal RCA stents. Patent OM1 stent.\par  [de-identified] : CABG: CABG. 8/12/15. LIMA to LAD, SVG to DRCA, SVG to OM1. \par  [de-identified] : Carotid duplex 12/20/18. 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 Doctor Beau\par

## 2022-12-14 NOTE — HISTORY OF PRESENT ILLNESS
[FreeTextEntry1] : This 89 year-old female patient presents for cardiovascular evaluation.\par \par Her problem list is as noted above.\par \par Since her last examination she reports no major events or hospitalizations.  She did have a right eye laser procedure for a "film".  There was some improvement.\par \par The patient is moving to a new senior center.  She remains active but could do more.  I encouraged her regarding this particularly at her new location.\par \par There have been no symptoms of chest discomfort, shortness of breath, palpitation, lightheadedness, fainting.\par \par She did have medical follow-up.

## 2022-12-14 NOTE — ASSESSMENT
[FreeTextEntry1] : EKG 12/14/2022.  Dual-chamber pacing.\par EKG 6/15/2022.  Dual-chamber pacing.  Magnet rate equals 85, nominal.\par EKG 12/14/2021.  Dual-chamber pacing.  Magnet rate equals 85, nominal.\par EKG 6/9/2021.  Dual-chamber pacing.  Magnet rate equals 85, nominal\par EKG 12/7/2020.  Dual-chamber pacing.  Magnet rate equals 85, normal.\par EKG 6/3/2020.  Sinus rhythm with a–V pacing.  Magnet rate equals 85, nominal\par EKG 6/12/19. Dual-chamber pacemaker. VPC. Magnet rate nominal 85

## 2022-12-14 NOTE — DISCUSSION/SUMMARY
[FreeTextEntry1] : Brief recommendations and follow-up: (see above for details)\par \par Patient remains with a considerable cardiovascular and medical problem list.\par Nevertheless she is well compensated and doing well.\par Medications reviewed and reconciled.\par Coronary status stable.\par Valve status stable and the patient and family were advised regarding appropriate warning signs.\par Peripheral vascular status stable.\par Pacemaker function normal.  She has an upcoming comprehensive visit.\par She will keep her pacemaker clinic visit, Medtronic as scheduled in March.\par Next full cardiology visit with me 6 months.\par Today's visit 40 minutes.

## 2022-12-14 NOTE — REVIEW OF SYSTEMS
[FreeTextEntry3] : Bilateral cataract surgery. [FreeTextEntry4] : She has hearing loss. [FreeTextEntry5] : See HPI. [FreeTextEntry7] : He has treated GERD. [FreeTextEntry8] : Nocturia 2 or 3 times.  She does note some urinary frequency. [FreeTextEntry9] : Mild hand and knee arthritis. [de-identified] : Chronic imbalance.  Balance therapy at [place] was not much help.

## 2022-12-14 NOTE — REASON FOR VISIT
[FreeTextEntry3] : Dr. Pandey. [FreeTextEntry1] : Ms. BILLY AREVALO has the following complex problem list:\par \par Coronary artery disease status post bypass surgery 2015 with previous stent.\par History of complete heart block treated with pacemaker therapy. Medtronic device.\par Valvular heart disease.\par History of subarachnoid hemorrhage.\par Dyslipidemia.\par Cardiac arrhythmia/ventricular tachycardia.\par Carotid atherosclerosis.\par Peripheral vascular disease with right lower extremity stent, a complication of her iliac artery dissection during angiography.\par Type 2 diabetes\par \par She has additional medical problems as noted.\par \par Her primary care physician is Dr. Pandey.\par \par

## 2022-12-19 ENCOUNTER — APPOINTMENT (OUTPATIENT)
Dept: GERIATRICS | Facility: CLINIC | Age: 87
End: 2022-12-19

## 2022-12-19 DIAGNOSIS — Z91.81 HISTORY OF FALLING: ICD-10-CM

## 2022-12-19 PROCEDURE — 99442: CPT

## 2022-12-21 NOTE — HISTORY OF PRESENT ILLNESS
[Home] : at home, [unfilled] , at the time of the visit. [Medical Office: ([facility])___] : at the medical office located in  [Verbal consent obtained from patient] : the patient, [unfilled] [Family Member] : family member [FreeTextEntry1] : [facility] [address] [de-identified] : 89 year old female with a history of depression, dyslipidemia, vascular dementia, HTN, DM2, valvular heart disease, CAD who presents today via telephone for completion of paperwork for care home. \par \par Will be moving to [place], previous paperwork not acceptable as current move in date is 1/11/2023. \par \par Fell yesterday at home.  Hit right hip.  Bruise not visible but does have tenderness.  Used topical arnica which has helped.  Able to bear weight without restrictions.  \par \par Continues to have issues with balance.  Uses a walker outside of home for stability.  Has live in aid who helps with things around the home.  She can complete her ADLs independently.  \par \par Denies any cardiac or pulmonary symptoms.

## 2022-12-21 NOTE — REVIEW OF SYSTEMS
[Incontinence] : incontinence [Negative] : Psychiatric [FreeTextEntry3] : Cloudiness in right eye [FreeTextEntry9] : Right hip pain [de-identified] : Balance issues

## 2022-12-28 DIAGNOSIS — Z02.2 ENCOUNTER FOR EXAMINATION FOR ADMISSION TO RESIDENTIAL INSTITUTION: ICD-10-CM

## 2022-12-30 ENCOUNTER — RESULT REVIEW (OUTPATIENT)
Age: 87
End: 2022-12-30

## 2023-02-17 RX ORDER — ASPIRIN 81 MG/1
81 TABLET, CHEWABLE ORAL DAILY
Qty: 90 | Refills: 3 | Status: DISCONTINUED | COMMUNITY
Start: 2022-06-21 | End: 2023-02-17

## 2023-03-20 ENCOUNTER — APPOINTMENT (OUTPATIENT)
Dept: GERIATRICS | Facility: CLINIC | Age: 88
End: 2023-03-20
Payer: MEDICARE

## 2023-03-20 VITALS
HEIGHT: 60 IN | DIASTOLIC BLOOD PRESSURE: 60 MMHG | HEART RATE: 92 BPM | OXYGEN SATURATION: 96 % | TEMPERATURE: 96.5 F | SYSTOLIC BLOOD PRESSURE: 144 MMHG | WEIGHT: 134 LBS | BODY MASS INDEX: 26.31 KG/M2

## 2023-03-20 PROCEDURE — 99214 OFFICE O/P EST MOD 30 MIN: CPT

## 2023-03-23 ENCOUNTER — APPOINTMENT (OUTPATIENT)
Dept: CARDIOLOGY | Facility: CLINIC | Age: 88
End: 2023-03-23
Payer: MEDICARE

## 2023-03-23 VITALS — DIASTOLIC BLOOD PRESSURE: 60 MMHG | OXYGEN SATURATION: 96 % | HEART RATE: 74 BPM | SYSTOLIC BLOOD PRESSURE: 120 MMHG

## 2023-03-23 PROCEDURE — 93280 PM DEVICE PROGR EVAL DUAL: CPT

## 2023-03-23 NOTE — CARDIOLOGY SUMMARY
[de-identified] : 12/14/22 Dual chamber pacing [de-identified] : Stress test 9/16/15. Nondiagnostic secondary to pacemaker rhythm. No ischemia clinically. 3 minutes 30 seconds. Aidan stage I. Intermediate protocol. 3.5 METs. 67%.  [de-identified] : Echo 9/12/2022.  Normal LV function.  EF 70%.  Mild diastolic dysfunction.  Mitral thickening and annular calcification.  Mild MR.  Calcified aortic leaflets with overall mild restricted excursion.  Mild to moderate AS.  Peak/mean gradient 37/22.  JATIN 1.36.  Trace TR.  Normal PA, 23 mmHg.  Mildly dilated LA.  No change from 1/15/2021.\par \par Echo 1/15/2021.  A.  F.  Normal LV function.  EF 70%.  LVH.  Mild diastolic dysfunction.  MAC.  Mild MR.  Mild or moderate AS.  Gradient 32/15.  Trace AI.  Similar to 2018 study.\par \par Echo 12/20/18. Normal LV function. EF 54%. Mild diastolic dysfunction. Dilated left atrium. Mild MAC. Mild MR. Mild AS. Mild AI. Gradient 28/16. Mild TR. No significant  change from 2017\par Echo.12/15/17. Normal LV function. EF 75%, Dilated LA. Mitral thickening. Mild MR.  Mild-mod AS, Gradientt 31/17. JATIN. 0.9. Trace AI. Mild-mod TR. Similar to 2016. [de-identified] : Pacemaker 8/14/2015 at the time of bypass surgery. Doctor Jp. Medtronic.Advisa   Dual-chamber. Model #A2DR01 performed for complete heart block. MRI compatible.  Magnet rate equals 85.\par  [de-identified] :  Angiogram Doctor Timmermans 8/11/15. 90% distal left main. 60% mid LAD. 90%  second diagonal. 90% right PDA. EF 60%. Patent proximal RCA stents. Patent OM1 stent.\par  [de-identified] : CABG: CABG. 8/12/15. LIMA to LAD, SVG to DRCA, SVG to OM1. \par  [de-identified] : Carotid duplex 12/20/18. 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 Doctor Beau\par

## 2023-03-23 NOTE — PROCEDURE
[Pacemaker] : pacemaker [Medtronic] : Medtronic [DDDR] : DDDR [Complete Heart Block] : complete heart block [Threshold Testing Performed] : Threshold testing was performed [None] : none [Counters Reset] : the counters were reset [de-identified] : Advisa  MRI A2DR01 [de-identified] : [id number] [de-identified] : 8/14/2015 [de-identified] : 70/130 [de-identified] : 1.5 years [de-identified] : Normal device function

## 2023-03-23 NOTE — PHYSICAL EXAM
[Normal Appearance] : normal appearance [Well Groomed] : well groomed [General Appearance - In No Acute Distress] : no acute distress [] : no respiratory distress [Respiration, Rhythm And Depth] : normal respiratory rhythm and effort

## 2023-03-23 NOTE — HISTORY OF PRESENT ILLNESS
[de-identified] : \par 89 year old female with history of complete heart block at time of CABG s/p placement of Medtronic dual chamber pacemaker on 8/14/2015. 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 Ford denies chest pain, SOB, palpitations, dizziness or syncope.  \par

## 2023-03-23 NOTE — DISCUSSION/SUMMARY
[Pacemaker Function Normal] : normal pacemaker function [Patient] : the patient [Family] : the patient's family [de-identified] : Device analysis in 6 months. Continue remote monitoring.

## 2023-03-24 NOTE — PHYSICAL EXAM
[General Appearance - Alert] : alert [General Appearance - In No Acute Distress] : in no acute distress [General Appearance - Well Nourished] : well nourished [General Appearance - Well Developed] : well developed [Sclera] : the sclera and conjunctiva were normal [PERRL With Normal Accommodation] : pupils were equal in size, round, and reactive to light [Extraocular Movements] : extraocular movements were intact [Normal Oral Mucosa] : normal oral mucosa [No Oral Pallor] : no oral pallor [Neck Appearance] : the appearance of the neck was normal [Respiration, Rhythm And Depth] : normal respiratory rhythm and effort [Exaggerated Use Of Accessory Muscles For Inspiration] : no accessory muscle use [Auscultation Breath Sounds / Voice Sounds] : lungs were clear to auscultation bilaterally [Heart Rate And Rhythm] : heart rate was normal and rhythm regular [Heart Sounds] : normal S1 and S2 [Heart Sounds Gallop] : no gallops [Breast Appearance] : normal in appearance [Breast Palpation Mass] : no palpable masses [Breast Abnormal Lactation (Galactorrhea)] : no nipple discharge [Bowel Sounds] : normal bowel sounds [Abdomen Soft] : soft [Abdomen Tenderness] : non-tender [Cervical Lymph Nodes Enlarged Posterior Bilaterally] : posterior cervical [Cervical Lymph Nodes Enlarged Anterior Bilaterally] : anterior cervical [Supraclavicular Lymph Nodes Enlarged Bilaterally] : supraclavicular [Axillary Lymph Nodes Enlarged Bilaterally] : axillary [No CVA Tenderness] : no ~M costovertebral angle tenderness [No Spinal Tenderness] : no spinal tenderness [Abnormal Walk] : normal gait [FreeTextEntry1] : uses cane [Skin Color & Pigmentation] : normal skin color and pigmentation [] : no rash [Mood] : the mood was normal [Affect] : the affect was normal

## 2023-03-24 NOTE — HISTORY OF PRESENT ILLNESS
[FreeTextEntry1] : concern about food at her living facility\par wants to discuss vitamins\par open to labs to assess cholesterol and vitamin levels\par presenting with daughter\par otherwise doing quite well [No falls in past year] : Patient reported no falls in the past year [Completely Dependent] : Completely dependent. [Smoke Detector] : smoke detector [0] : 2) Feeling down, depressed, or hopeless: Not at all (0) [PHQ-2 Negative - No further assessment needed] : PHQ-2 Negative - No further assessment needed [[id number]] : 0

## 2023-06-20 ENCOUNTER — NON-APPOINTMENT (OUTPATIENT)
Age: 88
End: 2023-06-20

## 2023-06-26 ENCOUNTER — APPOINTMENT (OUTPATIENT)
Dept: CARDIOLOGY | Facility: CLINIC | Age: 88
End: 2023-06-26
Payer: MEDICARE

## 2023-06-26 ENCOUNTER — NON-APPOINTMENT (OUTPATIENT)
Age: 88
End: 2023-06-26

## 2023-06-26 VITALS
WEIGHT: 139 LBS | HEART RATE: 80 BPM | OXYGEN SATURATION: 98 % | DIASTOLIC BLOOD PRESSURE: 72 MMHG | HEIGHT: 60 IN | BODY MASS INDEX: 27.29 KG/M2 | SYSTOLIC BLOOD PRESSURE: 118 MMHG

## 2023-06-26 PROCEDURE — 99215 OFFICE O/P EST HI 40 MIN: CPT | Mod: 25

## 2023-06-26 PROCEDURE — 93000 ELECTROCARDIOGRAM COMPLETE: CPT

## 2023-06-26 NOTE — CARDIOLOGY SUMMARY
[de-identified] : Stress test 9/16/15. Nondiagnostic secondary to pacemaker rhythm. No ischemia clinically. 3 minutes 30 seconds. Aidan stage I. Intermediate protocol. 3.5 METs. 67%.\par Stress echo 11/7/2014. Nondiagnostic EKG portion, LBBB. Associated delayed septal activation. No echo or clinical symptoms of angina. 6 minutes. Intermediate\par     protocol. Aidan stage I. 4.7 METs. 89% maximum heart rate.\par  [de-identified] : Echo 9/12/2022.  Normal LV function.  EF 70%.  Mild diastolic dysfunction.  Mitral thickening and annular calcification.  Mild MR.  Calcified aortic leaflets with overall mild restricted excursion.  Mild to moderate AS.  Peak/mean gradient 37/22.  JATIN 1.36.  Trace TR.  Normal PA, 23 mmHg.  Mildly dilated LA.  No change from 1/15/2021.\par \par Echo 1/15/2021.  A.  F.  Normal LV function.  EF 70%.  LVH.  Mild diastolic dysfunction.  MAC.  Mild MR.  Mild or moderate AS.  Gradient 32/15.  Trace AI.  Similar to 2018 study.\par \par Echo 12/20/18. Normal LV function. EF 54%. Mild diastolic dysfunction. Dilated left atrium. Mild MAC. Mild MR. Mild AS. Mild AI. Gradient 28/16. Mild TR. No significant  change from 2017\par Echo.12/15/17. Normal LV function. EF 75%, Dilated LA. Mitral thickening. Mild MR.  Mild-mod AS, Gradientt 31/17. JATIN. 0.9. Trace AI. Mild-mod TR. Similar to 2016. [de-identified] : Pacemaker 8/14/2015 at the time of bypass surgery. Doctor Jp. Medtronic.Advisa   Dual-chamber. Model #A2DR01 performed for complete heart block. MRI compatible.  Magnet rate equals 85.\par  [de-identified] :  Angiogram Doctor Timmermans 8/11/15. 90% distal left main. 60% mid LAD. 90%  second diagonal. 90% right PDA. EF 60%. Patent proximal RCA stents. Patent OM1 stent.\par  [de-identified] : CABG: CABG. 8/12/15. LIMA to LAD, SVG to DRCA, SVG to OM1. \par  [de-identified] : Carotid duplex 12/20/18. 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 Doctor Beau\par

## 2023-06-26 NOTE — REVIEW OF SYSTEMS
[FreeTextEntry3] : Bilateral cataract surgery. [FreeTextEntry4] : She has hearing loss. [FreeTextEntry5] : See HPI. [FreeTextEntry7] : He has treated GERD. [FreeTextEntry8] : Nocturia 2 or 3 times.  She does note some urinary frequency. [FreeTextEntry9] : Mild hand and knee arthritis. [de-identified] : Chronic imbalance.  Balance therapy at [place] was not much help. [de-identified] : Some memory loss.

## 2023-06-26 NOTE — DISCUSSION/SUMMARY
[FreeTextEntry1] : Brief recommendations and follow-up: (see above for details)\par \par Patient remains with a considerable cardiovascular and medical problem list.\par Nevertheless she is well compensated and doing well.\par Medications reviewed and reconciled.\par Coronary status stable.\par Valve status stable and the patient and family were advised regarding appropriate warning signs.\par Peripheral vascular status stable.\par Pacemaker function normal.  Analysis in March indicated 1.5-year battery life approximately.  She has an upcoming visit later this year.\par She will keep her pacemaker clinic visit, Medtronic as scheduled in September\par Next full cardiology visit with me 6 months.  I anticipate ordering a echocardiogram at that time.\par Laboratories will be provided and I will review.\par Today's visit 40 minutes.

## 2023-06-26 NOTE — PHYSICAL EXAM
[de-identified] : Gain of 6 pounds in 6 months. [de-identified] : Carotid bruit versus transmitted murmur. [de-identified] : 2/6 systolic murmur left sternal border. [de-identified] : Uses a cane Imbalance. [de-identified] : trace edema

## 2023-06-26 NOTE — ASSESSMENT
[FreeTextEntry1] : EKG 6/26/2023.  Dual-chamber pacing.  Magnet rate equals 3x100 followed by 85, nominal\par EKG 12/14/2022.  Dual-chamber pacing.\par EKG 6/15/2022.  Dual-chamber pacing.  Magnet rate equals 85, nominal.\par EKG 12/14/2021.  Dual-chamber pacing.  Magnet rate equals 85, nominal.\par EKG 6/9/2021.  Dual-chamber pacing.  Magnet rate equals 85, nominal\par EKG 12/7/2020.  Dual-chamber pacing.  Magnet rate equals 85, normal.\par EKG 6/3/2020.  Sinus rhythm with a–V pacing.  Magnet rate equals 85, nominal\par EKG 6/12/19. Dual-chamber pacemaker. VPC. Magnet rate nominal 85

## 2023-06-26 NOTE — HISTORY OF PRESENT ILLNESS
[FreeTextEntry1] : This 89 year-old female patient presents for cardiovascular evaluation.\par \par Her problem list is as noted above.\par \par The patient's daughter, Sunita, assists with the history.\par \par Since her last examination approximately 6 months ago there have been no major events or hospitalizations.  She does note "a little shortness of breath" but no acute decompensation.  She also notes episodic leg fatigue.  She also notes some imbalance.  She evidently has gone through balance therapy.  No PND, orthopnea, chest discomfort, palpitation, lightheadedness.\par \par She has had laboratories that were evidently were coordinated at her home.  I have reviewed the computer record and they do not seem to be scanned in.  The patient's daughter will track this down for our records.\par

## 2023-09-26 ENCOUNTER — LABORATORY RESULT (OUTPATIENT)
Age: 88
End: 2023-09-26

## 2023-09-26 ENCOUNTER — APPOINTMENT (OUTPATIENT)
Dept: GERIATRICS | Facility: CLINIC | Age: 88
End: 2023-09-26
Payer: MEDICARE

## 2023-09-26 VITALS
DIASTOLIC BLOOD PRESSURE: 56 MMHG | OXYGEN SATURATION: 95 % | BODY MASS INDEX: 26.33 KG/M2 | SYSTOLIC BLOOD PRESSURE: 114 MMHG | WEIGHT: 134.8 LBS | HEART RATE: 78 BPM | TEMPERATURE: 97.2 F

## 2023-09-26 DIAGNOSIS — R26.89 OTHER ABNORMALITIES OF GAIT AND MOBILITY: ICD-10-CM

## 2023-09-26 DIAGNOSIS — M25.662 STIFFNESS OF LEFT KNEE, NOT ELSEWHERE CLASSIFIED: ICD-10-CM

## 2023-09-26 PROCEDURE — G0439: CPT

## 2023-09-26 PROCEDURE — 36415 COLL VENOUS BLD VENIPUNCTURE: CPT

## 2023-09-28 ENCOUNTER — APPOINTMENT (OUTPATIENT)
Dept: CARDIOLOGY | Facility: CLINIC | Age: 88
End: 2023-09-28
Payer: MEDICARE

## 2023-09-28 VITALS — DIASTOLIC BLOOD PRESSURE: 60 MMHG | OXYGEN SATURATION: 95 % | HEART RATE: 70 BPM | SYSTOLIC BLOOD PRESSURE: 130 MMHG

## 2023-09-28 PROCEDURE — 93280 PM DEVICE PROGR EVAL DUAL: CPT

## 2023-09-28 RX ORDER — NIRMATRELVIR AND RITONAVIR 300-100 MG
20 X 150 MG & KIT ORAL
Qty: 30 | Refills: 0 | Status: COMPLETED | COMMUNITY
Start: 2023-07-04

## 2023-10-03 LAB
25(OH)D3 SERPL-MCNC: 45.4 NG/ML
ALBUMIN SERPL ELPH-MCNC: 5.1 G/DL
ALP BLD-CCNC: 76 U/L
ALT SERPL-CCNC: 22 U/L
ANION GAP SERPL CALC-SCNC: 12 MMOL/L
APPEARANCE: CLEAR
AST SERPL-CCNC: 33 U/L
BASOPHILS # BLD AUTO: 0.04 K/UL
BASOPHILS NFR BLD AUTO: 0.5 %
BILIRUB SERPL-MCNC: 0.3 MG/DL
BILIRUBIN URINE: NEGATIVE
BLOOD URINE: NEGATIVE
BUN SERPL-MCNC: 20 MG/DL
CALCIUM SERPL-MCNC: 10 MG/DL
CHLORIDE SERPL-SCNC: 99 MMOL/L
CO2 SERPL-SCNC: 28 MMOL/L
COLOR: YELLOW
CREAT SERPL-MCNC: 0.82 MG/DL
EGFR: 65 ML/MIN/1.73M2
EOSINOPHIL # BLD AUTO: 0.19 K/UL
EOSINOPHIL NFR BLD AUTO: 2.5 %
ESTIMATED AVERAGE GLUCOSE: 166 MG/DL
GLUCOSE QUALITATIVE U: NEGATIVE MG/DL
GLUCOSE SERPL-MCNC: 134 MG/DL
HBA1C MFR BLD HPLC: 7.4 %
HCT VFR BLD CALC: 44.1 %
HGB BLD-MCNC: 13.9 G/DL
IMM GRANULOCYTES NFR BLD AUTO: 0.3 %
KETONES URINE: NEGATIVE MG/DL
LEUKOCYTE ESTERASE URINE: ABNORMAL
LYMPHOCYTES # BLD AUTO: 1.84 K/UL
LYMPHOCYTES NFR BLD AUTO: 24.5 %
MAN DIFF?: NORMAL
MCHC RBC-ENTMCNC: 30.9 PG
MCHC RBC-ENTMCNC: 31.5 GM/DL
MCV RBC AUTO: 98 FL
MONOCYTES # BLD AUTO: 0.79 K/UL
MONOCYTES NFR BLD AUTO: 10.5 %
NEUTROPHILS # BLD AUTO: 4.63 K/UL
NEUTROPHILS NFR BLD AUTO: 61.7 %
NITRITE URINE: NEGATIVE
PH URINE: 7
PLATELET # BLD AUTO: 272 K/UL
POTASSIUM SERPL-SCNC: 4.4 MMOL/L
PROT SERPL-MCNC: 7.6 G/DL
PROTEIN URINE: NORMAL MG/DL
RBC # BLD: 4.5 M/UL
RBC # FLD: 13.6 %
SODIUM SERPL-SCNC: 139 MMOL/L
SPECIFIC GRAVITY URINE: 1.02
TSH SERPL-ACNC: 4.22 UIU/ML
UROBILINOGEN URINE: 0.2 MG/DL
VIT B12 SERPL-MCNC: 1258 PG/ML
WBC # FLD AUTO: 7.51 K/UL

## 2023-12-13 ENCOUNTER — RX RENEWAL (OUTPATIENT)
Age: 88
End: 2023-12-13

## 2023-12-31 PROBLEM — Z23 NEED FOR VACCINATION WITH 13-POLYVALENT PNEUMOCOCCAL CONJUGATE VACCINE: Status: RESOLVED | Noted: 2019-08-14 | Resolved: 2021-05-25

## 2024-01-08 ENCOUNTER — NON-APPOINTMENT (OUTPATIENT)
Age: 89
End: 2024-01-08

## 2024-01-09 ENCOUNTER — APPOINTMENT (OUTPATIENT)
Dept: CARDIOLOGY | Facility: CLINIC | Age: 89
End: 2024-01-09
Payer: MEDICARE

## 2024-01-09 ENCOUNTER — NON-APPOINTMENT (OUTPATIENT)
Age: 89
End: 2024-01-09

## 2024-01-09 VITALS
OXYGEN SATURATION: 96 % | SYSTOLIC BLOOD PRESSURE: 126 MMHG | HEIGHT: 60 IN | WEIGHT: 135 LBS | HEART RATE: 81 BPM | BODY MASS INDEX: 26.5 KG/M2 | DIASTOLIC BLOOD PRESSURE: 62 MMHG

## 2024-01-09 DIAGNOSIS — Z01.89 ENCOUNTER FOR OTHER SPECIFIED SPECIAL EXAMINATIONS: ICD-10-CM

## 2024-01-09 DIAGNOSIS — E11.9 TYPE 2 DIABETES MELLITUS W/OUT COMPLICATIONS: ICD-10-CM

## 2024-01-09 PROCEDURE — 93000 ELECTROCARDIOGRAM COMPLETE: CPT

## 2024-01-09 PROCEDURE — 99215 OFFICE O/P EST HI 40 MIN: CPT | Mod: 25

## 2024-01-09 RX ORDER — HYDROCORTISONE 25 MG/ML
2.5 LOTION TOPICAL
Qty: 59 | Refills: 0 | Status: DISCONTINUED | COMMUNITY
Start: 2022-10-11 | End: 2024-01-09

## 2024-02-16 ENCOUNTER — APPOINTMENT (OUTPATIENT)
Dept: GERIATRICS | Facility: CLINIC | Age: 89
End: 2024-02-16
Payer: MEDICARE

## 2024-02-16 VITALS
WEIGHT: 130 LBS | HEART RATE: 77 BPM | BODY MASS INDEX: 25.39 KG/M2 | TEMPERATURE: 98.6 F | SYSTOLIC BLOOD PRESSURE: 130 MMHG | DIASTOLIC BLOOD PRESSURE: 64 MMHG | OXYGEN SATURATION: 99 %

## 2024-02-16 DIAGNOSIS — R53.83 OTHER FATIGUE: ICD-10-CM

## 2024-02-16 DIAGNOSIS — I73.9 PERIPHERAL VASCULAR DISEASE, UNSPECIFIED: ICD-10-CM

## 2024-02-16 DIAGNOSIS — R41.3 OTHER AMNESIA: ICD-10-CM

## 2024-02-16 DIAGNOSIS — Z71.89 OTHER SPECIFIED COUNSELING: ICD-10-CM

## 2024-02-16 DIAGNOSIS — F32.A DEPRESSION, UNSPECIFIED: ICD-10-CM

## 2024-02-16 DIAGNOSIS — R42 DIZZINESS AND GIDDINESS: ICD-10-CM

## 2024-02-16 PROCEDURE — G2211 COMPLEX E/M VISIT ADD ON: CPT | Mod: NC,1L

## 2024-02-16 PROCEDURE — 99215 OFFICE O/P EST HI 40 MIN: CPT | Mod: 25

## 2024-02-16 PROCEDURE — 90471 IMMUNIZATION ADMIN: CPT

## 2024-02-16 PROCEDURE — 99497 ADVNCD CARE PLAN 30 MIN: CPT | Mod: 25

## 2024-02-16 PROCEDURE — 90715 TDAP VACCINE 7 YRS/> IM: CPT

## 2024-02-17 PROBLEM — F32.A DEPRESSION: Status: ACTIVE | Noted: 2018-11-05

## 2024-02-17 PROBLEM — R41.3 MEMORY LOSS: Status: ACTIVE | Noted: 2018-11-05

## 2024-02-17 PROBLEM — I73.9 PERIPHERAL VASCULAR DISEASE OF LOWER EXTREMITY: Status: ACTIVE | Noted: 2018-11-05

## 2024-02-17 PROBLEM — R53.83 FATIGUE: Status: ACTIVE | Noted: 2019-04-25

## 2024-02-17 PROBLEM — R42 DIZZINESS: Status: ACTIVE | Noted: 2021-07-19

## 2024-02-17 PROBLEM — Z71.89 ADVANCED DIRECTIVES, COUNSELING/DISCUSSION: Status: ACTIVE | Noted: 2024-02-17

## 2024-02-17 NOTE — REVIEW OF SYSTEMS
[Incontinence] : incontinence [Fever] : no fever [Chills] : no chills [Eye Pain] : no eye pain [Red Eyes] : eyes not red [Sore Throat] : no sore throat [Hoarseness] : no hoarseness [Chest Pain] : no chest pain [Palpitations] : no palpitations [Shortness Of Breath] : no shortness of breath [Wheezing] : no wheezing [Abdominal Pain] : no abdominal pain [Vomiting] : no vomiting [Dysuria] : no dysuria [Arthralgias] : no arthralgias [Skin Lesions] : no skin lesions [Skin Wound] : no skin wound [Dizziness] : no dizziness [Fainting] : no fainting [Suicidal] : not suicidal [Sleep Disturbances] : no sleep disturbances

## 2024-02-17 NOTE — ASSESSMENT
[FreeTextEntry1] : wrote for PT and OT discussed interventions for urinary incontinence agrees to see urology also gave referral for new podiatrist Dr Laura ACHARYA finalized - see above will scan to chart daughter will bring in HCP from home memory testing infuture given decline Tdap given today

## 2024-02-17 NOTE — PHYSICAL EXAM
[General Appearance - Alert] : alert [General Appearance - In No Acute Distress] : in no acute distress [General Appearance - Well Nourished] : well nourished [General Appearance - Well Developed] : well developed [Sclera] : the sclera and conjunctiva were normal [PERRL With Normal Accommodation] : pupils were equal in size, round, and reactive to light [Extraocular Movements] : extraocular movements were intact [Normal Oral Mucosa] : normal oral mucosa [No Oral Pallor] : no oral pallor [Neck Appearance] : the appearance of the neck was normal [Respiration, Rhythm And Depth] : normal respiratory rhythm and effort [Exaggerated Use Of Accessory Muscles For Inspiration] : no accessory muscle use [Auscultation Breath Sounds / Voice Sounds] : lungs were clear to auscultation bilaterally [Heart Rate And Rhythm] : heart rate was normal and rhythm regular [Heart Sounds] : normal S1 and S2 [Heart Sounds Gallop] : no gallops [Breast Appearance] : normal in appearance [Breast Palpation Mass] : no palpable masses [Breast Abnormal Lactation (Galactorrhea)] : no nipple discharge [Bowel Sounds] : normal bowel sounds [Abdomen Soft] : soft [Abdomen Tenderness] : non-tender [Cervical Lymph Nodes Enlarged Posterior Bilaterally] : posterior cervical [Cervical Lymph Nodes Enlarged Anterior Bilaterally] : anterior cervical [Supraclavicular Lymph Nodes Enlarged Bilaterally] : supraclavicular [Axillary Lymph Nodes Enlarged Bilaterally] : axillary [No CVA Tenderness] : no ~M costovertebral angle tenderness [No Spinal Tenderness] : no spinal tenderness [Abnormal Walk] : normal gait [Skin Color & Pigmentation] : normal skin color and pigmentation [] : no rash [Affect] : the affect was normal [Mood] : the mood was normal [FreeTextEntry1] : uses cane

## 2024-02-17 NOTE — HISTORY OF PRESENT ILLNESS
[No falls in past year] : Patient reported no falls in the past year [0] : 2) Feeling down, depressed, or hopeless: Not at all (0) [Completely Independent] : Completely independent. [Completely Dependent] : Completely dependent. [Smoke Detector] : smoke detector [PHQ-2 Negative - No further assessment needed] : PHQ-2 Negative - No further assessment needed [Reviewed updated] : Reviewed, updated [DNR] : DNR [DNI] : DNI [Last Verification Date: ___] : Last Verification Date: [unfilled] [I will adhere to the patient's wishes.] : I will adhere to the patient's wishes. [Time Spent: ___ minutes] : Time Spent: [unfilled] minutes [FreeTextEntry1] : living at [facility] in [place]  concerns: Balance remains poor  open to PT and OT to help with ADLS  Nocturia with accidents 3 episodes a night discussed role of urology, possibility of PT - biofeedback and possibly estrogen products  presenting with daughter otherwise doing quite well finalized MOLST today no HCP on file, daughter will bring in next visit [[id number]] : 0 [AdvancecareDate] : 02/16/24 [FreeTextEntry4] : DNR DNI but open to anitbiotics and fluids if needed

## 2024-03-01 ENCOUNTER — RESULT REVIEW (OUTPATIENT)
Age: 89
End: 2024-03-01

## 2024-03-01 ENCOUNTER — RX RENEWAL (OUTPATIENT)
Age: 89
End: 2024-03-01

## 2024-03-01 RX ORDER — ASPIRIN 81 MG/1
81 TABLET, COATED ORAL DAILY
Qty: 90 | Refills: 3 | Status: ACTIVE | COMMUNITY
Start: 2024-03-01 | End: 1900-01-01

## 2024-03-01 RX ORDER — AMLODIPINE BESYLATE 5 MG/1
5 TABLET ORAL
Qty: 90 | Refills: 3 | Status: ACTIVE | COMMUNITY
Start: 2021-01-12 | End: 1900-01-01

## 2024-03-01 RX ORDER — MAGNESIUM 200 MG
1000 TABLET ORAL
Qty: 28 | Refills: 11 | Status: ACTIVE | COMMUNITY
Start: 2024-03-01 | End: 1900-01-01

## 2024-03-01 RX ORDER — ESOMEPRAZOLE MAGNESIUM 40 MG/1
40 CAPSULE, DELAYED RELEASE ORAL
Qty: 24 | Refills: 2 | Status: ACTIVE | COMMUNITY
Start: 2019-05-21 | End: 1900-01-01

## 2024-03-01 RX ORDER — ATORVASTATIN CALCIUM 40 MG/1
40 TABLET, FILM COATED ORAL DAILY
Qty: 90 | Refills: 3 | Status: ACTIVE | COMMUNITY
Start: 2019-05-21 | End: 1900-01-01

## 2024-03-01 RX ORDER — ADHESIVE TAPE 3"X 2.3 YD
50 MCG TAPE, NON-MEDICATED TOPICAL
Qty: 24 | Refills: 2 | Status: ACTIVE | COMMUNITY
Start: 2022-10-31 | End: 1900-01-01

## 2024-03-28 ENCOUNTER — APPOINTMENT (OUTPATIENT)
Dept: CARDIOLOGY | Facility: CLINIC | Age: 89
End: 2024-03-28
Payer: MEDICARE

## 2024-03-28 VITALS — HEART RATE: 72 BPM | SYSTOLIC BLOOD PRESSURE: 120 MMHG | DIASTOLIC BLOOD PRESSURE: 70 MMHG | OXYGEN SATURATION: 98 %

## 2024-03-28 PROCEDURE — 93280 PM DEVICE PROGR EVAL DUAL: CPT

## 2024-03-28 PROCEDURE — 99213 OFFICE O/P EST LOW 20 MIN: CPT

## 2024-03-28 RX ORDER — ASPIRIN 81 MG/1
81 TABLET, DELAYED RELEASE ORAL DAILY
Qty: 90 | Refills: 3 | Status: COMPLETED | COMMUNITY
Start: 2023-02-17 | End: 2024-03-28

## 2024-03-29 RX ORDER — METOPROLOL SUCCINATE 50 MG/1
50 TABLET, EXTENDED RELEASE ORAL
Qty: 180 | Refills: 3 | Status: ACTIVE | COMMUNITY
Start: 2020-12-07

## 2024-03-29 NOTE — CARDIOLOGY SUMMARY
[de-identified] : EKG 1/9/2024. Dual-chamber pacing. Magnet rate equals 85, nominal. [de-identified] : Stress test 9/16/15. Nondiagnostic secondary to pacemaker rhythm. No ischemia clinically. 3 minutes 30 seconds. Aidan stage I. Intermediate protocol. 3.5 METs. 67%. [de-identified] : Echo 9/12/2022.  Normal LV function.  EF 70%.  Mild diastolic dysfunction.  Mitral thickening and annular calcification.  Mild MR.  Calcified aortic leaflets with overall mild restricted excursion.  Mild to moderate AS.  Peak/mean gradient 37/22.  JATIN 1.36.  Trace TR.  Normal PA, 23 mmHg.  Mildly dilated LA.  No change from 1/15/2021.  [de-identified] : Pacemaker 8/14/2015 at the time of bypass surgery. Doctor Jp. Medtronic.Advisa   Dual-chamber. Model #A2DR01 performed for complete heart block. MRI compatible.  Magnet rate equals 85.\par   [de-identified] :  Angiogram Doctor Timmermans 8/11/15. 90% distal left main. 60% mid LAD. 90%  second diagonal. 90% right PDA. EF 60%. Patent proximal RCA stents. Patent OM1 stent.\par   [de-identified] : CABG: CABG. 8/12/15. LIMA to LAD, SVG to DRCA, SVG to OM1. \par   [de-identified] : Carotid duplex 12/20/18. 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 Doctor Beau\par

## 2024-03-29 NOTE — DISCUSSION/SUMMARY
[Pacemaker Function Normal] : normal pacemaker function [Patient] : the patient [de-identified] : Continue remote monitoring. Device interrogation in 6 months

## 2024-03-29 NOTE — PROCEDURE
[Complete Heart Block] : complete heart block [Medtronic] : Medtronic [Pacemaker] : pacemaker [DDDR] : DDDR [Counters Reset] : the counters were reset [None] : none [de-identified] : Advisa  MRI A2DR01 [de-identified] : 8/14/2015 [de-identified] : [id number] [de-identified] : 70/130 [de-identified] : 9 months [de-identified] : Normal device function.

## 2024-03-29 NOTE — HISTORY OF PRESENT ILLNESS
[de-identified] : 89 year old female with history of complete heart block at time of CABG s/p placement of Medtronic dual chamber pacemaker on 8/14/2015. 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  Ms. Ford denies chest pain, SOB, palpitations, dizziness or syncope.

## 2024-03-29 NOTE — REVIEW OF SYSTEMS
[SOB] : no shortness of breath [Chest Discomfort] : no chest discomfort [Lower Ext Edema] : no extremity edema [Palpitations] : no palpitations [Syncope] : no syncope [Confusion] : no confusion was observed [Easy Bleeding] : no tendency for easy bleeding [Easy Bruising] : no tendency for easy bruising

## 2024-03-29 NOTE — PHYSICAL EXAM
[Well Groomed] : well groomed [General Appearance - In No Acute Distress] : no acute distress [Heart Rate And Rhythm] : heart rate and rhythm were normal [Heart Sounds] : normal S1 and S2 [] : no respiratory distress [Auscultation Breath Sounds / Voice Sounds] : lungs were clear to auscultation bilaterally [Respiration, Rhythm And Depth] : normal respiratory rhythm and effort

## 2024-04-06 ENCOUNTER — NON-APPOINTMENT (OUTPATIENT)
Age: 89
End: 2024-04-06

## 2024-04-10 DIAGNOSIS — K59.09 OTHER CONSTIPATION: ICD-10-CM

## 2024-04-19 NOTE — PHYSICAL EXAM
Up as tolerated.   [No Acute Distress] : no acute distress [Well-Appearing] : well-appearing [Normal Sclera/Conjunctiva] : normal sclera/conjunctiva [PERRL] : pupils equal round and reactive to light [EOMI] : extraocular movements intact [Normal Outer Ear/Nose] : the outer ears and nose were normal in appearance [Normal Oropharynx] : the oropharynx was normal [No JVD] : no jugular venous distention [Supple] : supple [No Respiratory Distress] : no respiratory distress  [Clear to Auscultation] : lungs were clear to auscultation bilaterally [Normal Rate] : normal rate  [Normal S1, S2] : normal S1 and S2 [No Abdominal Bruit] : a ~M bruit was not heard ~T in the abdomen [No Edema] : there was no peripheral edema [Soft] : abdomen soft [Non Tender] : non-tender [Non-distended] : non-distended [Normal Bowel Sounds] : normal bowel sounds [Normal Posterior Cervical Nodes] : no posterior cervical lymphadenopathy [Normal Anterior Cervical Nodes] : no anterior cervical lymphadenopathy [No Focal Deficits] : no focal deficits [de-identified] : Excessive cerumen bilaterally

## 2024-04-25 ENCOUNTER — APPOINTMENT (OUTPATIENT)
Dept: CARDIOLOGY | Facility: CLINIC | Age: 89
End: 2024-04-25
Payer: MEDICARE

## 2024-04-25 PROCEDURE — 93880 EXTRACRANIAL BILAT STUDY: CPT

## 2024-04-25 PROCEDURE — 93306 TTE W/DOPPLER COMPLETE: CPT

## 2024-04-29 DIAGNOSIS — Z92.89 PERSONAL HISTORY OF OTHER MEDICAL TREATMENT: ICD-10-CM

## 2024-06-03 ENCOUNTER — RX RENEWAL (OUTPATIENT)
Age: 89
End: 2024-06-03

## 2024-06-03 RX ORDER — ESCITALOPRAM OXALATE 10 MG/1
10 TABLET ORAL
Qty: 14 | Refills: 3 | Status: ACTIVE | COMMUNITY
Start: 2019-05-21 | End: 1900-01-01

## 2024-06-11 ENCOUNTER — APPOINTMENT (OUTPATIENT)
Dept: CARDIOLOGY | Facility: CLINIC | Age: 89
End: 2024-06-11
Payer: MEDICARE

## 2024-06-11 ENCOUNTER — NON-APPOINTMENT (OUTPATIENT)
Age: 89
End: 2024-06-11

## 2024-06-11 VITALS
SYSTOLIC BLOOD PRESSURE: 119 MMHG | BODY MASS INDEX: 26.9 KG/M2 | HEIGHT: 60 IN | WEIGHT: 137 LBS | DIASTOLIC BLOOD PRESSURE: 60 MMHG | OXYGEN SATURATION: 96 % | HEART RATE: 79 BPM

## 2024-06-11 DIAGNOSIS — I10 ESSENTIAL (PRIMARY) HYPERTENSION: ICD-10-CM

## 2024-06-11 DIAGNOSIS — I38 ENDOCARDITIS, VALVE UNSPECIFIED: ICD-10-CM

## 2024-06-11 DIAGNOSIS — I65.29 OCCLUSION AND STENOSIS OF UNSPECIFIED CAROTID ARTERY: ICD-10-CM

## 2024-06-11 DIAGNOSIS — E78.5 HYPERLIPIDEMIA, UNSPECIFIED: ICD-10-CM

## 2024-06-11 DIAGNOSIS — Z95.0 PRESENCE OF CARDIAC PACEMAKER: ICD-10-CM

## 2024-06-11 PROCEDURE — G2211 COMPLEX E/M VISIT ADD ON: CPT

## 2024-06-11 PROCEDURE — 93000 ELECTROCARDIOGRAM COMPLETE: CPT

## 2024-06-11 PROCEDURE — 99215 OFFICE O/P EST HI 40 MIN: CPT

## 2024-06-14 ENCOUNTER — APPOINTMENT (OUTPATIENT)
Dept: GERIATRICS | Facility: CLINIC | Age: 89
End: 2024-06-14
Payer: MEDICARE

## 2024-06-14 VITALS
TEMPERATURE: 98.3 F | WEIGHT: 136 LBS | BODY MASS INDEX: 26.56 KG/M2 | SYSTOLIC BLOOD PRESSURE: 134 MMHG | DIASTOLIC BLOOD PRESSURE: 78 MMHG | HEART RATE: 76 BPM | OXYGEN SATURATION: 95 %

## 2024-06-14 DIAGNOSIS — R26.81 UNSTEADINESS ON FEET: ICD-10-CM

## 2024-06-14 DIAGNOSIS — F01.50 VASCULAR DEMENTIA W/OUT BEHAVIORAL DISTURBANCE: ICD-10-CM

## 2024-06-14 DIAGNOSIS — I25.10 ATHEROSCLEROTIC HEART DISEASE OF NATIVE CORONARY ARTERY W/OUT ANGINA PECTORIS: ICD-10-CM

## 2024-06-14 DIAGNOSIS — I44.2 ATRIOVENTRICULAR BLOCK, COMPLETE: ICD-10-CM

## 2024-06-14 PROCEDURE — G2211 COMPLEX E/M VISIT ADD ON: CPT

## 2024-06-14 PROCEDURE — 99214 OFFICE O/P EST MOD 30 MIN: CPT

## 2024-06-14 PROCEDURE — 36415 COLL VENOUS BLD VENIPUNCTURE: CPT

## 2024-06-14 RX ORDER — NITROGLYCERIN 400 UG/1
0.4 SPRAY ORAL
Qty: 1 | Refills: 6 | Status: ACTIVE | COMMUNITY
Start: 2020-02-25 | End: 1900-01-01

## 2024-06-14 RX ORDER — LOPERAMIDE HYDROCHLORIDE 2 MG/1
2 CAPSULE, LIQUID FILLED ORAL TWICE DAILY
Qty: 14 | Refills: 0 | Status: COMPLETED | COMMUNITY
Start: 2024-04-10 | End: 2024-06-14

## 2024-06-17 LAB
25(OH)D3 SERPL-MCNC: 52.1 NG/ML
ALBUMIN SERPL ELPH-MCNC: 4.5 G/DL
ALP BLD-CCNC: 78 U/L
ALT SERPL-CCNC: 21 U/L
ANION GAP SERPL CALC-SCNC: 11 MMOL/L
AST SERPL-CCNC: 33 U/L
BASOPHILS # BLD AUTO: 0.04 K/UL
BASOPHILS NFR BLD AUTO: 0.5 %
BILIRUB SERPL-MCNC: 0.2 MG/DL
BUN SERPL-MCNC: 22 MG/DL
CALCIUM SERPL-MCNC: 9.7 MG/DL
CHLORIDE SERPL-SCNC: 102 MMOL/L
CO2 SERPL-SCNC: 26 MMOL/L
CREAT SERPL-MCNC: 0.9 MG/DL
EGFR: 59 ML/MIN/1.73M2
EOSINOPHIL # BLD AUTO: 0.19 K/UL
EOSINOPHIL NFR BLD AUTO: 2.2 %
ESTIMATED AVERAGE GLUCOSE: 166 MG/DL
GLUCOSE SERPL-MCNC: 136 MG/DL
HBA1C MFR BLD HPLC: 7.4 %
HCT VFR BLD CALC: 40.3 %
HGB BLD-MCNC: 13.2 G/DL
IMM GRANULOCYTES NFR BLD AUTO: 0.5 %
LYMPHOCYTES # BLD AUTO: 2.04 K/UL
LYMPHOCYTES NFR BLD AUTO: 23.5 %
MAN DIFF?: NORMAL
MCHC RBC-ENTMCNC: 30.6 PG
MCHC RBC-ENTMCNC: 32.8 GM/DL
MCV RBC AUTO: 93.3 FL
MONOCYTES # BLD AUTO: 0.86 K/UL
MONOCYTES NFR BLD AUTO: 9.9 %
NEUTROPHILS # BLD AUTO: 5.5 K/UL
NEUTROPHILS NFR BLD AUTO: 63.4 %
PLATELET # BLD AUTO: 257 K/UL
POTASSIUM SERPL-SCNC: 5 MMOL/L
PROT SERPL-MCNC: 7.2 G/DL
RBC # BLD: 4.32 M/UL
RBC # FLD: 14 %
SODIUM SERPL-SCNC: 139 MMOL/L
TSH SERPL-ACNC: 3.6 UIU/ML
VIT B12 SERPL-MCNC: 1430 PG/ML
WBC # FLD AUTO: 8.67 K/UL

## 2024-06-19 PROBLEM — I25.10 CORONARY ARTERY DISEASE: Status: ACTIVE | Noted: 2018-11-05

## 2024-06-19 PROBLEM — R26.81 GAIT INSTABILITY: Status: ACTIVE | Noted: 2023-09-26

## 2024-06-19 PROBLEM — I44.2 COMPLETE HEART BLOCK: Status: ACTIVE | Noted: 2018-11-05

## 2024-06-19 PROBLEM — F01.50 VASCULAR DEMENTIA WITHOUT BEHAVIORAL DISTURBANCE: Status: ACTIVE | Noted: 2020-07-22

## 2024-06-19 NOTE — HISTORY OF PRESENT ILLNESS
[No falls in past year] : Patient reported no falls in the past year [Walker] : walker [0] : 2) Feeling down, depressed, or hopeless: Not at all (0) [FreeTextEntry1] : living at [facility] in [place]  concerns: Balance remains poor  open to PT and OT to help with ADLS  Nocturia with accidents 3 episodes a night discussed role of urology, possibility of PT - biofeedback and possibly estrogen products  presenting with daughter Beth (sometimes with other daughter Johnson) otherwise doing quite well needs to repeat labs [Completely Independent] : Completely independent. [Completely Dependent] : Completely dependent. [Smoke Detector] : smoke detector [Carbon Monoxide Detector] : carbon monoxide detector [NO] : No [PHQ-2 Negative - No further assessment needed] : PHQ-2 Negative - No further assessment needed [[id number]] : 0

## 2024-06-19 NOTE — PHYSICAL EXAM
[General Appearance - Alert] : alert [General Appearance - In No Acute Distress] : in no acute distress [General Appearance - Well Nourished] : well nourished [General Appearance - Well Developed] : well developed [Sclera] : the sclera and conjunctiva were normal [PERRL With Normal Accommodation] : pupils were equal in size, round, and reactive to light [Extraocular Movements] : extraocular movements were intact [Normal Oral Mucosa] : normal oral mucosa [No Oral Pallor] : no oral pallor [Neck Appearance] : the appearance of the neck was normal [Respiration, Rhythm And Depth] : normal respiratory rhythm and effort [Exaggerated Use Of Accessory Muscles For Inspiration] : no accessory muscle use [Auscultation Breath Sounds / Voice Sounds] : lungs were clear to auscultation bilaterally [Heart Rate And Rhythm] : heart rate was normal and rhythm regular [Heart Sounds] : normal S1 and S2 [Heart Sounds Gallop] : no gallops [Breast Appearance] : normal in appearance [Breast Palpation Mass] : no palpable masses [Breast Abnormal Lactation (Galactorrhea)] : no nipple discharge [Bowel Sounds] : normal bowel sounds [Abdomen Soft] : soft [Abdomen Tenderness] : non-tender [Cervical Lymph Nodes Enlarged Posterior Bilaterally] : posterior cervical [Cervical Lymph Nodes Enlarged Anterior Bilaterally] : anterior cervical [Supraclavicular Lymph Nodes Enlarged Bilaterally] : supraclavicular [Axillary Lymph Nodes Enlarged Bilaterally] : axillary [No CVA Tenderness] : no ~M costovertebral angle tenderness [No Spinal Tenderness] : no spinal tenderness [FreeTextEntry1] : uses cane [Abnormal Walk] : normal gait [Skin Color & Pigmentation] : normal skin color and pigmentation [] : no rash [Affect] : the affect was normal [Mood] : the mood was normal

## 2024-06-19 NOTE — ASSESSMENT
[FreeTextEntry1] : labs drawn in office goal is symptom management MOLST was updated this year can consider memory testing if we feel it would change the managmenet at this time goal is allowing [facility]

## 2024-06-20 DIAGNOSIS — M54.2 CERVICALGIA: ICD-10-CM

## 2024-06-20 RX ORDER — ACETAMINOPHEN 325 MG/1
325 TABLET, FILM COATED ORAL EVERY 6 HOURS
Qty: 60 | Refills: 11 | Status: ACTIVE | COMMUNITY
Start: 2024-06-20 | End: 1900-01-01

## 2024-07-01 ENCOUNTER — TRANSCRIPTION ENCOUNTER (OUTPATIENT)
Age: 89
End: 2024-07-01

## 2024-07-03 ENCOUNTER — NON-APPOINTMENT (OUTPATIENT)
Age: 89
End: 2024-07-03

## 2024-07-10 ENCOUNTER — APPOINTMENT (OUTPATIENT)
Dept: GERIATRICS | Facility: CLINIC | Age: 89
End: 2024-07-10

## 2024-07-25 ENCOUNTER — APPOINTMENT (OUTPATIENT)
Dept: GERIATRICS | Facility: CLINIC | Age: 89
End: 2024-07-25

## 2024-07-25 VITALS
BODY MASS INDEX: 24.74 KG/M2 | OXYGEN SATURATION: 97 % | DIASTOLIC BLOOD PRESSURE: 68 MMHG | WEIGHT: 126 LBS | TEMPERATURE: 98.1 F | SYSTOLIC BLOOD PRESSURE: 112 MMHG | HEART RATE: 68 BPM | HEIGHT: 60 IN

## 2024-07-25 DIAGNOSIS — R41.3 OTHER AMNESIA: ICD-10-CM

## 2024-07-25 DIAGNOSIS — F01.50 VASCULAR DEMENTIA W/OUT BEHAVIORAL DISTURBANCE: ICD-10-CM

## 2024-07-25 DIAGNOSIS — R26.81 UNSTEADINESS ON FEET: ICD-10-CM

## 2024-07-25 DIAGNOSIS — I44.2 ATRIOVENTRICULAR BLOCK, COMPLETE: ICD-10-CM

## 2024-07-25 DIAGNOSIS — E11.9 TYPE 2 DIABETES MELLITUS W/OUT COMPLICATIONS: ICD-10-CM

## 2024-07-25 PROCEDURE — 36415 COLL VENOUS BLD VENIPUNCTURE: CPT

## 2024-07-25 PROCEDURE — G2211 COMPLEX E/M VISIT ADD ON: CPT

## 2024-07-25 PROCEDURE — 99214 OFFICE O/P EST MOD 30 MIN: CPT

## 2024-07-26 LAB
BASOPHILS # BLD AUTO: 0.02 K/UL
BASOPHILS NFR BLD AUTO: 0.3 %
EOSINOPHIL # BLD AUTO: 0.22 K/UL
EOSINOPHIL NFR BLD AUTO: 3.2 %
HCT VFR BLD CALC: 39 %
HGB BLD-MCNC: 12.4 G/DL
IMM GRANULOCYTES NFR BLD AUTO: 0.4 %
LYMPHOCYTES # BLD AUTO: 1.41 K/UL
LYMPHOCYTES NFR BLD AUTO: 20.7 %
MAN DIFF?: NORMAL
MCHC RBC-ENTMCNC: 30.7 PG
MCHC RBC-ENTMCNC: 31.8 GM/DL
MCV RBC AUTO: 96.5 FL
MONOCYTES # BLD AUTO: 0.65 K/UL
MONOCYTES NFR BLD AUTO: 9.5 %
NEUTROPHILS # BLD AUTO: 4.49 K/UL
NEUTROPHILS NFR BLD AUTO: 65.9 %
PLATELET # BLD AUTO: 225 K/UL
RBC # BLD: 4.04 M/UL
RBC # FLD: 14 %
WBC # FLD AUTO: 6.82 K/UL

## 2024-08-01 NOTE — HISTORY OF PRESENT ILLNESS
[Any fall with injury in past year] : Patient reported fall with injury in the past year [Walker] : walker [Wheelchair] : wheelchair [0] : 1) Little interest or pleasure doing things: Not at all (0) [1] : 2) Feeling down, depressed, or hopeless for several days (1) [FreeTextEntry1] : Patient was admitted to [facility] on 6/26/24.   Patient was discharged to rehab, [facility] rehab on 7/1/24.   Discharge diagnosis: Rib and Pelvic fractures.    History and hospital course: 89-year-old female patient comes for rehab facility with past medical history of hypertension hyperlipidemia diabetes, coronary artery disease presents to the ED of [facility] after she had a fall few hours ago apparently patient was feeding the dog and became off balance and fell on her right side with patient complaining of left hip pain and right thoracic paraspinal pain. Imaging studies revealed Mildly displaced fractures of the right posterior third through fourth ribs. No pleural effusion or pneumothorax.Very small right lower lobe nodule. Acute comminuted left superior and inferior pubic rami fractures of the pubic symphyseal junction. Extraperitoneal hemorrhage within the periventricular space. Patient was admitted for pain management. Pain is controlled, patient is clinically remaining stable. Patient was evaluated by PT who is recommending STR.  Update: Has returned back to [facility] and feeling well.  Does have pain when transferring.  Hard to predict.  But otherwise feeling well/   [Independent] : toileting [Full assistance needed] : Assistance needed managing medications [] : Assistance needed managing finances. [NO] : No [PHQ-2 Negative - No further assessment needed] : PHQ-2 Negative - No further assessment needed [WQI2Abitl] : 1

## 2024-08-01 NOTE — ASSESSMENT
[FreeTextEntry1] : pain is hard to predict advise Tylenol 1000mg TID standing x 30 days only and then can change to PRN this will help her with transfers and pain that is hard to predict labs drawn in office - CBC to assure stability daughter mustapha present for visit

## 2024-08-01 NOTE — HISTORY OF PRESENT ILLNESS
[Any fall with injury in past year] : Patient reported fall with injury in the past year [Walker] : walker [Wheelchair] : wheelchair [0] : 1) Little interest or pleasure doing things: Not at all (0) [1] : 2) Feeling down, depressed, or hopeless for several days (1) [FreeTextEntry1] : Patient was admitted to [place] on 6/26/24.   Patient was discharged to rehab, [facility] rehab on 7/1/24.   Discharge diagnosis: Rib and Pelvic fractures.    History and hospital course: 89-year-old female patient comes for rehab facility with past medical history of hypertension hyperlipidemia diabetes, coronary artery disease presents to the ED of [facility] after she had a fall few hours ago apparently patient was feeding the dog and became off balance and fell on her right side with patient complaining of left hip pain and right thoracic paraspinal pain. Imaging studies revealed Mildly displaced fractures of the right posterior third through fourth ribs. No pleural effusion or pneumothorax.Very small right lower lobe nodule. Acute comminuted left superior and inferior pubic rami fractures of the pubic symphyseal junction. Extraperitoneal hemorrhage within the periventricular space. Patient was admitted for pain management. Pain is controlled, patient is clinically remaining stable. Patient was evaluated by PT who is recommending STR.  Update: Has returned back to [facility] and feeling well.  Does have pain when transferring.  Hard to predict.  But otherwise feeling well/   [Independent] : toileting [Full assistance needed] : Assistance needed managing medications [] : Assistance needed managing finances. [NO] : No [PHQ-2 Negative - No further assessment needed] : PHQ-2 Negative - No further assessment needed [[id number]] : 1

## 2024-08-08 ENCOUNTER — NON-APPOINTMENT (OUTPATIENT)
Age: 89
End: 2024-08-08

## 2024-08-17 ENCOUNTER — NON-APPOINTMENT (OUTPATIENT)
Age: 89
End: 2024-08-17

## 2024-08-19 DIAGNOSIS — R52 PAIN, UNSPECIFIED: ICD-10-CM

## 2024-08-19 RX ORDER — CLOPIDOGREL BISULFATE 75 MG/1
75 TABLET, FILM COATED ORAL
Qty: 30 | Refills: 0 | Status: ACTIVE | COMMUNITY
Start: 2024-08-19 | End: 1900-01-01

## 2024-08-19 RX ORDER — ACETAMINOPHEN 500 MG/1
500 TABLET, COATED ORAL 3 TIMES DAILY
Qty: 180 | Refills: 0 | Status: ACTIVE | COMMUNITY
Start: 2024-08-19 | End: 1900-01-01

## 2024-08-19 RX ORDER — ASPIRIN 325 MG/1
325 TABLET, FILM COATED ORAL DAILY
Qty: 30 | Refills: 0 | Status: ACTIVE | COMMUNITY
Start: 2024-08-19 | End: 1900-01-01

## 2024-08-19 RX ORDER — LIDOCAINE 40 MG/G
4 PATCH TOPICAL
Qty: 30 | Refills: 0 | Status: ACTIVE | COMMUNITY
Start: 2024-08-19 | End: 1900-01-01

## 2024-08-19 RX ORDER — PANTOPRAZOLE 40 MG/1
40 TABLET, DELAYED RELEASE ORAL
Qty: 30 | Refills: 0 | Status: ACTIVE | COMMUNITY
Start: 2024-08-19 | End: 1900-01-01

## 2024-08-19 RX ORDER — AMLODIPINE BESYLATE 10 MG/1
10 TABLET ORAL
Qty: 30 | Refills: 0 | Status: ACTIVE | COMMUNITY
Start: 2024-08-19 | End: 1900-01-01

## 2024-08-19 RX ORDER — ATORVASTATIN CALCIUM 40 MG/1
40 TABLET, FILM COATED ORAL
Qty: 30 | Refills: 0 | Status: ACTIVE | COMMUNITY
Start: 2024-08-19 | End: 1900-01-01

## 2024-08-19 RX ORDER — LISINOPRIL 40 MG/1
40 TABLET ORAL
Qty: 30 | Refills: 0 | Status: ACTIVE | COMMUNITY
Start: 2024-08-19 | End: 1900-01-01

## 2024-08-19 RX ORDER — ATENOLOL 25 MG/1
25 TABLET ORAL
Qty: 30 | Refills: 0 | Status: ACTIVE | COMMUNITY
Start: 2024-08-19 | End: 1900-01-01

## 2024-08-26 DIAGNOSIS — S22.39XD FRACTURE OF ONE RIB, UNSPECIFIED SIDE, SUBSEQUENT ENCOUNTER FOR FRACTURE WITH ROUTINE HEALING: ICD-10-CM

## 2024-08-26 RX ORDER — IBUPROFEN 400 MG/1
400 TABLET, FILM COATED ORAL TWICE DAILY
Qty: 28 | Refills: 0 | Status: ACTIVE | COMMUNITY
Start: 2024-08-26 | End: 1900-01-01

## 2024-09-16 ENCOUNTER — NON-APPOINTMENT (OUTPATIENT)
Age: 89
End: 2024-09-16

## 2024-09-26 ENCOUNTER — APPOINTMENT (OUTPATIENT)
Dept: CARDIOLOGY | Facility: CLINIC | Age: 89
End: 2024-09-26
Payer: MEDICARE

## 2024-09-26 VITALS — OXYGEN SATURATION: 98 % | SYSTOLIC BLOOD PRESSURE: 110 MMHG | HEART RATE: 76 BPM | DIASTOLIC BLOOD PRESSURE: 62 MMHG

## 2024-09-26 DIAGNOSIS — Z95.0 PRESENCE OF CARDIAC PACEMAKER: ICD-10-CM

## 2024-09-26 PROCEDURE — 93288 INTERROG EVL PM/LDLS PM IP: CPT

## 2024-09-26 PROCEDURE — 99213 OFFICE O/P EST LOW 20 MIN: CPT

## 2024-09-27 NOTE — HISTORY OF PRESENT ILLNESS
[Palpitations] : no palpitations [SOB] : no dyspnea [Syncope] : no syncope [Dizziness] : no dizziness [Chest Pain] : no chest pain or discomfort [de-identified] : Eleni Ford presents for PPM device analysis.   89 year old female with history of complete heart block at time of CABG s/p placement of Medtronic dual chamber pacemaker on 8/14/2015. 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  6/26-7/1/24 [facility] admission for rib and pelvic fractures after a fall. She was discharged to [facility]. She was discharged to home on 7/12.

## 2024-09-27 NOTE — HISTORY OF PRESENT ILLNESS
[Palpitations] : no palpitations [SOB] : no dyspnea [Syncope] : no syncope [Dizziness] : no dizziness [Chest Pain] : no chest pain or discomfort [de-identified] : Eleni Ford presents for PPM device analysis.   89 year old female with history of complete heart block at time of CABG s/p placement of Medtronic dual chamber pacemaker on 8/14/2015. 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  6/26-7/1/24 [facility] admission for rib and pelvic fractures after a fall. She was discharged to [facility]. She was discharged to home on 7/12.

## 2024-09-27 NOTE — DISCUSSION/SUMMARY
[Pacemaker Function Normal] : normal pacemaker function [Patient] : the patient [Family] : the patient's family [de-identified] : Follow up in one month for full device analysis [FreeTextEntry1] : Patient will return for medication review and reconciliation

## 2024-09-27 NOTE — DISCUSSION/SUMMARY
[Pacemaker Function Normal] : normal pacemaker function [Patient] : the patient [Family] : the patient's family [de-identified] : Follow up in one month for full device analysis [FreeTextEntry1] : Patient will return for medication review and reconciliation

## 2024-09-27 NOTE — CARDIOLOGY SUMMARY
[de-identified] : EKG  6/11/24 SR,  [de-identified] : Stress test 9/16/15. Nondiagnostic secondary to pacemaker rhythm. No ischemia clinically. 3 minutes 30 seconds. Aidan stage I. Intermediate protocol. 3.5 METs. 67%. [de-identified] : Echo 4/25/24 1. Normal left ventricular chamber size and wall thickness with overall normal systolic function. Delayed and hypokinetic septum and apical septum due to pacemaker depolarization. LVEF estimated at 60%. Mild diastolic dysfunction. 2. Device lead is visualized in the right heart. 3. Mildly thickened mitral valve leaflets with normal excursion. Moderate annular calcification. Mild mitral insufficiency. 4. Calcified aortic valve leaflets with mildly to moderately restricted leaflet excursion. Peak/mean gradient of 22/14 mmHg. JATIN of 1.27 cm. Trace aortic insufficiency. Overall mild to moderate aortic stenosis. 5. Structurally normal tricuspid valve with normal leaflet excursion. Trace tricuspid regurgitation. 6. Estimated pulmonary artery systolic pressure is 25 mmHg. 7. Compared with study of 9/12/2022, no significant change.  [de-identified] : Pacemaker 8/14/2015 at the time of bypass surgery. Doctor Jp. Medtronic.Advisa   Dual-chamber. Model #A2DR01 performed for complete heart block. MRI compatible.  Magnet rate equals 85.\par   [de-identified] :  Angiogram Doctor Timmermans 8/11/15. 90% distal left main. 60% mid LAD. 90%  second diagonal. 90% right PDA. EF 60%. Patent proximal RCA stents. Patent OM1 stent.\par   [de-identified] : CABG: CABG. 8/12/15. LIMA to LAD, SVG to DRCA, SVG to OM1. \par   [de-identified] : Carotid duplex 12/20/18. 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 Doctor Beau\par

## 2024-09-27 NOTE — CARDIOLOGY SUMMARY
[de-identified] : EKG  6/11/24 SR,  [de-identified] : Stress test 9/16/15. Nondiagnostic secondary to pacemaker rhythm. No ischemia clinically. 3 minutes 30 seconds. Aidan stage I. Intermediate protocol. 3.5 METs. 67%. [de-identified] : Echo 4/25/24 1. Normal left ventricular chamber size and wall thickness with overall normal systolic function. Delayed and hypokinetic septum and apical septum due to pacemaker depolarization. LVEF estimated at 60%. Mild diastolic dysfunction. 2. Device lead is visualized in the right heart. 3. Mildly thickened mitral valve leaflets with normal excursion. Moderate annular calcification. Mild mitral insufficiency. 4. Calcified aortic valve leaflets with mildly to moderately restricted leaflet excursion. Peak/mean gradient of 22/14 mmHg. JATIN of 1.27 cm. Trace aortic insufficiency. Overall mild to moderate aortic stenosis. 5. Structurally normal tricuspid valve with normal leaflet excursion. Trace tricuspid regurgitation. 6. Estimated pulmonary artery systolic pressure is 25 mmHg. 7. Compared with study of 9/12/2022, no significant change.  [de-identified] : Pacemaker 8/14/2015 at the time of bypass surgery. Doctor Jp. Medtronic.Advisa   Dual-chamber. Model #A2DR01 performed for complete heart block. MRI compatible.  Magnet rate equals 85.\par   [de-identified] :  Angiogram Doctor Timmermans 8/11/15. 90% distal left main. 60% mid LAD. 90%  second diagonal. 90% right PDA. EF 60%. Patent proximal RCA stents. Patent OM1 stent.\par   [de-identified] : CABG: CABG. 8/12/15. LIMA to LAD, SVG to DRCA, SVG to OM1. \par   [de-identified] : Carotid duplex 12/20/18. 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 Doctor Beau\par

## 2024-09-27 NOTE — PHYSICAL EXAM
[Well Groomed] : well groomed [General Appearance - In No Acute Distress] : no acute distress [Heart Rate And Rhythm] : heart rate and rhythm were normal [Heart Sounds] : normal S1 and S2 [] : no respiratory distress [Respiration, Rhythm And Depth] : normal respiratory rhythm and effort [Auscultation Breath Sounds / Voice Sounds] : lungs were clear to auscultation bilaterally

## 2024-09-27 NOTE — PROCEDURE
[Complete Heart Block] : complete heart block [Pacemaker] : pacemaker [Medtronic] : Medtronic [DDDR] : DDDR [Threshold Testing Performed] : Threshold testing was performed [None] : none [Counters Reset] : the counters were reset [de-identified] : Advisa  MRI A2DR01 [de-identified] : [id number] [de-identified] : 8/14/2015 [de-identified] : 70/130 [de-identified] :  3 months [de-identified] : Normal device function.

## 2024-09-27 NOTE — PROCEDURE
[Complete Heart Block] : complete heart block [Pacemaker] : pacemaker [Medtronic] : Medtronic [DDDR] : DDDR [Threshold Testing Performed] : Threshold testing was performed [None] : none [Counters Reset] : the counters were reset [de-identified] : Advisa  MRI A2DR01 [de-identified] : [id number] [de-identified] : 8/14/2015 [de-identified] : 70/130 [de-identified] :  3 months [de-identified] : Normal device function.

## 2024-09-30 ENCOUNTER — NON-APPOINTMENT (OUTPATIENT)
Age: 89
End: 2024-09-30

## 2024-09-30 ENCOUNTER — APPOINTMENT (OUTPATIENT)
Dept: GERIATRICS | Facility: CLINIC | Age: 89
End: 2024-09-30
Payer: MEDICARE

## 2024-09-30 VITALS
TEMPERATURE: 97.1 F | BODY MASS INDEX: 25.52 KG/M2 | OXYGEN SATURATION: 99 % | DIASTOLIC BLOOD PRESSURE: 76 MMHG | WEIGHT: 130 LBS | HEIGHT: 60 IN | HEART RATE: 74 BPM | SYSTOLIC BLOOD PRESSURE: 124 MMHG

## 2024-09-30 DIAGNOSIS — E78.5 HYPERLIPIDEMIA, UNSPECIFIED: ICD-10-CM

## 2024-09-30 DIAGNOSIS — I10 ESSENTIAL (PRIMARY) HYPERTENSION: ICD-10-CM

## 2024-09-30 DIAGNOSIS — E11.9 TYPE 2 DIABETES MELLITUS W/OUT COMPLICATIONS: ICD-10-CM

## 2024-09-30 DIAGNOSIS — I25.10 ATHEROSCLEROTIC HEART DISEASE OF NATIVE CORONARY ARTERY W/OUT ANGINA PECTORIS: ICD-10-CM

## 2024-09-30 DIAGNOSIS — R42 DIZZINESS AND GIDDINESS: ICD-10-CM

## 2024-09-30 DIAGNOSIS — I44.2 ATRIOVENTRICULAR BLOCK, COMPLETE: ICD-10-CM

## 2024-09-30 DIAGNOSIS — F32.A DEPRESSION, UNSPECIFIED: ICD-10-CM

## 2024-09-30 DIAGNOSIS — F01.50 VASCULAR DEMENTIA W/OUT BEHAVIORAL DISTURBANCE: ICD-10-CM

## 2024-09-30 PROCEDURE — G0439: CPT

## 2024-10-02 RX ORDER — CLOPIDOGREL BISULFATE 75 MG/1
75 TABLET, FILM COATED ORAL
Qty: 1 | Refills: 0 | Status: COMPLETED | COMMUNITY
Start: 2024-10-02 | End: 2024-10-02

## 2024-10-02 RX ORDER — ASPIRIN 81 MG/1
81 TABLET, COATED ORAL
Qty: 30 | Refills: 11 | Status: ACTIVE | COMMUNITY
Start: 2024-10-02 | End: 1900-01-01

## 2024-10-02 RX ORDER — ASPIRIN 325 MG/1
325 TABLET, FILM COATED ORAL
Qty: 1 | Refills: 0 | Status: COMPLETED | COMMUNITY
Start: 2024-10-02 | End: 2024-10-02

## 2024-10-04 NOTE — PHYSICAL EXAM
[General Appearance - Alert] : alert [General Appearance - In No Acute Distress] : in no acute distress [General Appearance - Well Nourished] : well nourished [General Appearance - Well Developed] : well developed [FreeTextEntry1] : uses cane

## 2024-10-04 NOTE — HISTORY OF PRESENT ILLNESS
[FreeTextEntry1] : annual wellness, needs refills  [de-identified] : 89F presenting for annual wellness. Pain after rib fracture is greatly improved Using Lidocaine PRN there was an issue in August getting refills apparently [facility] but [facility] and old refills were used  antidepressant was restarted erroneously  she is feeling greatly improved

## 2024-10-04 NOTE — ASSESSMENT
[FreeTextEntry1] : daughter Gauri present for visit [facility] to [facility] and consult sheet medication list updated and reconciled lower ASA 325mg to 81mg only discontinue plavix all orders faxed to [facility] and messages left for both daughters next visit can discuss bone density this visits goal was medication reconciliation hx rib fracture goal is to avoid antidepressants and minimize medications as possible can get RSV at local pharmacy labs to be done later this month

## 2024-10-04 NOTE — HEALTH RISK ASSESSMENT
[Fair] :  ~his/her~ mood as fair [Monthly or less (1 pt)] : Monthly or less (1 point) [1 or 2 (0 pts)] : 1 or 2 (0 points) [Never (0 pts)] : Never (0 points) [No] : In the past 12 months have you used drugs other than those required for medical reasons? No [Any fall with injury in past year] : Patient reported fall with injury in the past year [0] : 2) Feeling down, depressed, or hopeless: Not at all (0) [PHQ-2 Negative - No further assessment needed] : PHQ-2 Negative - No further assessment needed [Never] : Never [NO] : No [None] : None [Alone] : lives alone [Retired] : retired [] :  [Feels Safe at Home] : Feels safe at home [Fully functional (bathing, dressing, toileting, transferring, walking, feeding)] : Fully functional (bathing, dressing, toileting, transferring, walking, feeding) [Fully functional (using the telephone, shopping, preparing meals, housekeeping, doing laundry, using] : Fully functional and needs no help or supervision to perform IADLs (using the telephone, shopping, preparing meals, housekeeping, doing laundry, using transportation, managing medications and managing finances) [Audit-CScore] : 1 [Lisandro] : 10sec [[id number]] : 0 [HIV test declined] : HIV test declined [Hepatitis C test declined] : Hepatitis C test declined [Change in mental status noted] : No change in mental status noted [Sexually Active] : not sexually active [High Risk Behavior] : no high risk behavior [Reports changes in hearing] : Reports no changes in hearing [Reports changes in vision] : Reports no changes in vision [Reports normal functional visual acuity (ie: able to read med bottle)] : Reports poor functional visual acuity.  [Reports changes in dental health] : Reports no changes in dental health [Smoke Detector] : smoke detector [Carbon Monoxide Detector] : carbon monoxide detector [MammogramComments] : not indicated [PapSmearComments] : not indicated [BoneDensityComments] : ordered  [ColonoscopyComments] : not indicated

## 2024-10-04 NOTE — HISTORY OF PRESENT ILLNESS
[FreeTextEntry1] : annual wellness, needs refills  [de-identified] : 89F presenting for annual wellness. Pain after rib fracture is greatly improved Using Lidocaine PRN there was an issue in August getting refills apparently [facility] and old refills were used  antidepressant was restarted erroneously  she is feeling greatly improved

## 2024-10-04 NOTE — HEALTH RISK ASSESSMENT
[Fair] :  ~his/her~ mood as fair [Monthly or less (1 pt)] : Monthly or less (1 point) [1 or 2 (0 pts)] : 1 or 2 (0 points) [Never (0 pts)] : Never (0 points) [No] : In the past 12 months have you used drugs other than those required for medical reasons? No [Any fall with injury in past year] : Patient reported fall with injury in the past year [0] : 2) Feeling down, depressed, or hopeless: Not at all (0) [PHQ-2 Negative - No further assessment needed] : PHQ-2 Negative - No further assessment needed [Never] : Never [NO] : No [None] : None [Alone] : lives alone [Retired] : retired [] :  [Feels Safe at Home] : Feels safe at home [Fully functional (bathing, dressing, toileting, transferring, walking, feeding)] : Fully functional (bathing, dressing, toileting, transferring, walking, feeding) [Fully functional (using the telephone, shopping, preparing meals, housekeeping, doing laundry, using] : Fully functional and needs no help or supervision to perform IADLs (using the telephone, shopping, preparing meals, housekeeping, doing laundry, using transportation, managing medications and managing finances) [Audit-CScore] : 1 [Lisandro] : 10sec [[id number]] : 0 [HIV test declined] : HIV test declined [Hepatitis C test declined] : Hepatitis C test declined [Change in mental status noted] : No change in mental status noted [Sexually Active] : not sexually active [High Risk Behavior] : no high risk behavior [Reports changes in hearing] : Reports no changes in hearing [Reports changes in vision] : Reports no changes in vision [Reports normal functional visual acuity (ie: able to read med bottle)] : Reports poor functional visual acuity.  [Reports changes in dental health] : Reports no changes in dental health [Smoke Detector] : smoke detector [Carbon Monoxide Detector] : carbon monoxide detector [MammogramComments] : not indicated [PapSmearComments] : not indicated [BoneDensityComments] : ordered  [ColonoscopyComments] : not indicated

## 2024-10-04 NOTE — HEALTH RISK ASSESSMENT
[Fair] :  ~his/her~ mood as fair [Monthly or less (1 pt)] : Monthly or less (1 point) [1 or 2 (0 pts)] : 1 or 2 (0 points) [Never (0 pts)] : Never (0 points) [No] : In the past 12 months have you used drugs other than those required for medical reasons? No [Any fall with injury in past year] : Patient reported fall with injury in the past year [0] : 2) Feeling down, depressed, or hopeless: Not at all (0) [PHQ-2 Negative - No further assessment needed] : PHQ-2 Negative - No further assessment needed [Never] : Never [NO] : No [None] : None [Alone] : lives alone [Retired] : retired [] :  [Feels Safe at Home] : Feels safe at home [Fully functional (bathing, dressing, toileting, transferring, walking, feeding)] : Fully functional (bathing, dressing, toileting, transferring, walking, feeding) [Fully functional (using the telephone, shopping, preparing meals, housekeeping, doing laundry, using] : Fully functional and needs no help or supervision to perform IADLs (using the telephone, shopping, preparing meals, housekeeping, doing laundry, using transportation, managing medications and managing finances) [Audit-CScore] : 1 [Lisandro] : 10sec [[id number]] : 0 [HIV test declined] : HIV test declined [Hepatitis C test declined] : Hepatitis C test declined [Change in mental status noted] : No change in mental status noted [Sexually Active] : not sexually active [High Risk Behavior] : no high risk behavior [Reports changes in hearing] : Reports no changes in hearing [Reports changes in vision] : Reports no changes in vision [Reports normal functional visual acuity (ie: able to read med bottle)] : Reports poor functional visual acuity.  [Reports changes in dental health] : Reports no changes in dental health [Smoke Detector] : smoke detector [Carbon Monoxide Detector] : carbon monoxide detector [MammogramComments] : not indicated [PapSmearComments] : not indicated [BoneDensityComments] : ordered  [ColonoscopyComments] : not indicated

## 2024-10-04 NOTE — HISTORY OF PRESENT ILLNESS
[FreeTextEntry1] : annual wellness, needs refills  [de-identified] : 89F presenting for annual wellness. Pain after rib fracture is greatly improved Using Lidocaine PRN there was an issue in August getting refills apparently the medications [facility]) but run out and old refills were used  antidepressant was restarted erroneously  she is feeling greatly improved

## 2024-10-17 ENCOUNTER — APPOINTMENT (OUTPATIENT)
Dept: GERIATRICS | Facility: CLINIC | Age: 89
End: 2024-10-17

## 2024-10-24 ENCOUNTER — APPOINTMENT (OUTPATIENT)
Dept: CARDIOLOGY | Facility: CLINIC | Age: 89
End: 2024-10-24

## 2024-10-30 ENCOUNTER — APPOINTMENT (OUTPATIENT)
Dept: CARDIOLOGY | Facility: CLINIC | Age: 89
End: 2024-10-30
Payer: MEDICARE

## 2024-10-30 ENCOUNTER — NON-APPOINTMENT (OUTPATIENT)
Age: 89
End: 2024-10-30

## 2024-10-30 VITALS
HEIGHT: 60 IN | HEART RATE: 82 BPM | TEMPERATURE: 98.2 F | RESPIRATION RATE: 16 BRPM | OXYGEN SATURATION: 96 % | BODY MASS INDEX: 25.91 KG/M2 | DIASTOLIC BLOOD PRESSURE: 60 MMHG | SYSTOLIC BLOOD PRESSURE: 110 MMHG | WEIGHT: 132 LBS

## 2024-10-30 DIAGNOSIS — E78.5 HYPERLIPIDEMIA, UNSPECIFIED: ICD-10-CM

## 2024-10-30 DIAGNOSIS — I10 ESSENTIAL (PRIMARY) HYPERTENSION: ICD-10-CM

## 2024-10-30 DIAGNOSIS — I38 ENDOCARDITIS, VALVE UNSPECIFIED: ICD-10-CM

## 2024-10-30 PROCEDURE — G2211 COMPLEX E/M VISIT ADD ON: CPT

## 2024-10-30 PROCEDURE — 99215 OFFICE O/P EST HI 40 MIN: CPT

## 2024-10-30 PROCEDURE — 93000 ELECTROCARDIOGRAM COMPLETE: CPT

## 2024-11-07 ENCOUNTER — APPOINTMENT (OUTPATIENT)
Dept: CARDIOLOGY | Facility: CLINIC | Age: 89
End: 2024-11-07

## 2024-11-14 ENCOUNTER — APPOINTMENT (OUTPATIENT)
Dept: CARDIOLOGY | Facility: CLINIC | Age: 89
End: 2024-11-14
Payer: MEDICARE

## 2024-11-14 VITALS — OXYGEN SATURATION: 97 % | HEART RATE: 70 BPM | SYSTOLIC BLOOD PRESSURE: 110 MMHG | DIASTOLIC BLOOD PRESSURE: 60 MMHG

## 2024-11-14 DIAGNOSIS — I10 ESSENTIAL (PRIMARY) HYPERTENSION: ICD-10-CM

## 2024-11-14 DIAGNOSIS — Z95.0 PRESENCE OF CARDIAC PACEMAKER: ICD-10-CM

## 2024-11-14 PROCEDURE — 99212 OFFICE O/P EST SF 10 MIN: CPT

## 2024-11-14 PROCEDURE — 93288 INTERROG EVL PM/LDLS PM IP: CPT

## 2024-11-26 ENCOUNTER — APPOINTMENT (OUTPATIENT)
Dept: HEART AND VASCULAR | Facility: CLINIC | Age: 89
End: 2024-11-26
Payer: MEDICARE

## 2024-11-26 VITALS
DIASTOLIC BLOOD PRESSURE: 57 MMHG | OXYGEN SATURATION: 95 % | BODY MASS INDEX: 25.78 KG/M2 | SYSTOLIC BLOOD PRESSURE: 120 MMHG | WEIGHT: 132 LBS | HEART RATE: 73 BPM

## 2024-11-26 DIAGNOSIS — Z95.0 PRESENCE OF CARDIAC PACEMAKER: ICD-10-CM

## 2024-11-26 DIAGNOSIS — I44.2 ATRIOVENTRICULAR BLOCK, COMPLETE: ICD-10-CM

## 2024-11-26 PROCEDURE — 99203 OFFICE O/P NEW LOW 30 MIN: CPT

## 2024-11-26 PROCEDURE — 93280 PM DEVICE PROGR EVAL DUAL: CPT

## 2024-12-16 ENCOUNTER — APPOINTMENT (OUTPATIENT)
Dept: GERIATRICS | Facility: CLINIC | Age: 88
End: 2024-12-16

## 2025-01-10 ENCOUNTER — RESULT REVIEW (OUTPATIENT)
Age: 89
End: 2025-01-10

## 2025-01-16 DIAGNOSIS — R05.1 ACUTE COUGH: ICD-10-CM

## 2025-01-21 ENCOUNTER — APPOINTMENT (OUTPATIENT)
Dept: HEART AND VASCULAR | Facility: CLINIC | Age: 89
End: 2025-01-21

## 2025-02-04 ENCOUNTER — APPOINTMENT (OUTPATIENT)
Dept: HEART AND VASCULAR | Facility: CLINIC | Age: 89
End: 2025-02-04
Payer: MEDICARE

## 2025-02-04 ENCOUNTER — TRANSCRIPTION ENCOUNTER (OUTPATIENT)
Age: 89
End: 2025-02-04

## 2025-02-04 VITALS
WEIGHT: 129 LBS | SYSTOLIC BLOOD PRESSURE: 132 MMHG | DIASTOLIC BLOOD PRESSURE: 58 MMHG | OXYGEN SATURATION: 95 % | HEART RATE: 79 BPM | BODY MASS INDEX: 25.32 KG/M2 | HEIGHT: 60 IN

## 2025-02-04 DIAGNOSIS — Z91.81 HISTORY OF FALLING: ICD-10-CM

## 2025-02-04 DIAGNOSIS — Z95.0 PRESENCE OF CARDIAC PACEMAKER: ICD-10-CM

## 2025-02-04 DIAGNOSIS — I44.2 ATRIOVENTRICULAR BLOCK, COMPLETE: ICD-10-CM

## 2025-02-04 PROCEDURE — 93280 PM DEVICE PROGR EVAL DUAL: CPT

## 2025-02-04 PROCEDURE — 99213 OFFICE O/P EST LOW 20 MIN: CPT | Mod: 25

## 2025-02-06 ENCOUNTER — TRANSCRIPTION ENCOUNTER (OUTPATIENT)
Age: 89
End: 2025-02-06

## 2025-03-11 ENCOUNTER — TRANSCRIPTION ENCOUNTER (OUTPATIENT)
Age: 89
End: 2025-03-11

## 2025-03-13 ENCOUNTER — APPOINTMENT (OUTPATIENT)
Dept: GERIATRICS | Facility: CLINIC | Age: 89
End: 2025-03-13
Payer: MEDICARE

## 2025-03-13 ENCOUNTER — TRANSCRIPTION ENCOUNTER (OUTPATIENT)
Age: 89
End: 2025-03-13

## 2025-03-13 ENCOUNTER — NON-APPOINTMENT (OUTPATIENT)
Age: 89
End: 2025-03-13

## 2025-03-13 DIAGNOSIS — K08.89 OTHER SPECIFIED DISORDERS OF TEETH AND SUPPORTING STRUCTURES: ICD-10-CM

## 2025-03-13 DIAGNOSIS — Z98.811 DENTAL RESTORATION STATUS: ICD-10-CM

## 2025-03-13 PROCEDURE — 99213 OFFICE O/P EST LOW 20 MIN: CPT | Mod: 2W

## 2025-04-01 ENCOUNTER — TRANSCRIPTION ENCOUNTER (OUTPATIENT)
Age: 89
End: 2025-04-01

## 2025-04-01 DIAGNOSIS — R26.89 OTHER ABNORMALITIES OF GAIT AND MOBILITY: ICD-10-CM

## 2025-04-29 ENCOUNTER — NON-APPOINTMENT (OUTPATIENT)
Age: 89
End: 2025-04-29

## 2025-04-29 ENCOUNTER — APPOINTMENT (OUTPATIENT)
Dept: CARDIOLOGY | Facility: CLINIC | Age: 89
End: 2025-04-29
Payer: MEDICARE

## 2025-04-29 VITALS
BODY MASS INDEX: 25.72 KG/M2 | HEIGHT: 60 IN | DIASTOLIC BLOOD PRESSURE: 56 MMHG | HEART RATE: 73 BPM | SYSTOLIC BLOOD PRESSURE: 105 MMHG | WEIGHT: 131 LBS | OXYGEN SATURATION: 96 %

## 2025-04-29 DIAGNOSIS — I25.10 ATHEROSCLEROTIC HEART DISEASE OF NATIVE CORONARY ARTERY W/OUT ANGINA PECTORIS: ICD-10-CM

## 2025-04-29 DIAGNOSIS — I38 ENDOCARDITIS, VALVE UNSPECIFIED: ICD-10-CM

## 2025-04-29 DIAGNOSIS — I10 ESSENTIAL (PRIMARY) HYPERTENSION: ICD-10-CM

## 2025-04-29 PROCEDURE — G2212 PROLONG OUTPT/OFFICE VIS: CPT

## 2025-04-29 PROCEDURE — 93000 ELECTROCARDIOGRAM COMPLETE: CPT

## 2025-04-29 PROCEDURE — 99215 OFFICE O/P EST HI 40 MIN: CPT

## 2025-05-13 ENCOUNTER — APPOINTMENT (OUTPATIENT)
Dept: GERIATRICS | Facility: CLINIC | Age: 89
End: 2025-05-13
Payer: MEDICARE

## 2025-05-13 VITALS
BODY MASS INDEX: 27.92 KG/M2 | DIASTOLIC BLOOD PRESSURE: 70 MMHG | HEART RATE: 66 BPM | SYSTOLIC BLOOD PRESSURE: 120 MMHG | HEIGHT: 58 IN | WEIGHT: 133 LBS | OXYGEN SATURATION: 95 %

## 2025-05-13 DIAGNOSIS — I73.9 PERIPHERAL VASCULAR DISEASE, UNSPECIFIED: ICD-10-CM

## 2025-05-13 DIAGNOSIS — Z86.39 PERSONAL HISTORY OF OTHER ENDOCRINE, NUTRITIONAL AND METABOLIC DISEASE: ICD-10-CM

## 2025-05-13 DIAGNOSIS — R32 UNSPECIFIED URINARY INCONTINENCE: ICD-10-CM

## 2025-05-13 DIAGNOSIS — I25.10 ATHEROSCLEROTIC HEART DISEASE OF NATIVE CORONARY ARTERY W/OUT ANGINA PECTORIS: ICD-10-CM

## 2025-05-13 DIAGNOSIS — I65.29 OCCLUSION AND STENOSIS OF UNSPECIFIED CAROTID ARTERY: ICD-10-CM

## 2025-05-13 DIAGNOSIS — K21.9 GASTRO-ESOPHAGEAL REFLUX DISEASE W/OUT ESOPHAGITIS: ICD-10-CM

## 2025-05-13 DIAGNOSIS — F01.50 VASCULAR DEMENTIA W/OUT BEHAVIORAL DISTURBANCE: ICD-10-CM

## 2025-05-13 PROCEDURE — G2211 COMPLEX E/M VISIT ADD ON: CPT

## 2025-05-13 PROCEDURE — 99214 OFFICE O/P EST MOD 30 MIN: CPT

## 2025-09-02 ENCOUNTER — RESULT REVIEW (OUTPATIENT)
Age: 89
End: 2025-09-02

## 2025-09-02 ENCOUNTER — APPOINTMENT (OUTPATIENT)
Dept: HEART AND VASCULAR | Facility: CLINIC | Age: 89
End: 2025-09-02
Payer: MEDICARE

## 2025-09-02 VITALS
SYSTOLIC BLOOD PRESSURE: 126 MMHG | DIASTOLIC BLOOD PRESSURE: 60 MMHG | BODY MASS INDEX: 27.08 KG/M2 | OXYGEN SATURATION: 97 % | HEART RATE: 80 BPM | HEIGHT: 58 IN | WEIGHT: 129 LBS

## 2025-09-02 DIAGNOSIS — Z95.0 PRESENCE OF CARDIAC PACEMAKER: ICD-10-CM

## 2025-09-02 DIAGNOSIS — R26.81 UNSTEADINESS ON FEET: ICD-10-CM

## 2025-09-02 DIAGNOSIS — I44.2 ATRIOVENTRICULAR BLOCK, COMPLETE: ICD-10-CM

## 2025-09-02 PROCEDURE — 93280 PM DEVICE PROGR EVAL DUAL: CPT

## 2025-09-02 PROCEDURE — 99213 OFFICE O/P EST LOW 20 MIN: CPT | Mod: 25

## 2025-09-03 ENCOUNTER — NON-APPOINTMENT (OUTPATIENT)
Age: 89
End: 2025-09-03